# Patient Record
Sex: MALE | Race: ASIAN | NOT HISPANIC OR LATINO | URBAN - METROPOLITAN AREA
[De-identification: names, ages, dates, MRNs, and addresses within clinical notes are randomized per-mention and may not be internally consistent; named-entity substitution may affect disease eponyms.]

---

## 2020-06-09 ENCOUNTER — OFFICE VISIT (OUTPATIENT)
Dept: URBAN - METROPOLITAN AREA MEDICAL CENTER 10 | Facility: MEDICAL CENTER | Age: 85
End: 2020-06-09
Payer: MEDICARE

## 2020-06-09 DIAGNOSIS — K31.89 ACQUIRED DEFORMITY OF PYLORUS: ICD-10-CM

## 2020-06-09 DIAGNOSIS — K76.6 CLINICALLY SIGNIFICANT PORTAL HYPERTENSION: ICD-10-CM

## 2020-06-09 PROCEDURE — 43235 EGD DIAGNOSTIC BRUSH WASH: CPT | Performed by: INTERNAL MEDICINE

## 2020-06-16 ENCOUNTER — LAB OUTSIDE AN ENCOUNTER (OUTPATIENT)
Dept: URBAN - METROPOLITAN AREA CLINIC 78 | Facility: CLINIC | Age: 85
End: 2020-06-16

## 2020-06-16 ENCOUNTER — OFFICE VISIT (OUTPATIENT)
Dept: URBAN - METROPOLITAN AREA CLINIC 78 | Facility: CLINIC | Age: 85
End: 2020-06-16
Payer: MEDICARE

## 2020-06-16 DIAGNOSIS — D64.9 ANEMIA: ICD-10-CM

## 2020-06-16 DIAGNOSIS — Z85.79 HISTORY OF LYMPHOMA: ICD-10-CM

## 2020-06-16 DIAGNOSIS — I85.00 IDIOPATHIC ESOPHAGEAL VARICES WITHOUT BLEEDING: ICD-10-CM

## 2020-06-16 DIAGNOSIS — K76.6 PORTAL HYPERTENSION: ICD-10-CM

## 2020-06-16 DIAGNOSIS — Z86.010 PERSONAL HISTORY OF COLONIC POLYPS: ICD-10-CM

## 2020-06-16 DIAGNOSIS — K74.60 HEPATIC CIRRHOSIS, UNSPECIFIED HEPATIC CIRRHOSIS TYPE, UNSPECIFIED WHETHER ASCITES PRESENT: ICD-10-CM

## 2020-06-16 DIAGNOSIS — K31.89 OTHER DISEASES OF STOMACH AND DUODENUM: ICD-10-CM

## 2020-06-16 DIAGNOSIS — R16.1 SPLENOMEGALY: ICD-10-CM

## 2020-06-16 DIAGNOSIS — D64.89 ANEMIA DUE TO OTHER CAUSE: ICD-10-CM

## 2020-06-16 DIAGNOSIS — R60.0 PEDAL EDEMA: ICD-10-CM

## 2020-06-16 DIAGNOSIS — K57.90 DIVERTICULOSIS: ICD-10-CM

## 2020-06-16 DIAGNOSIS — R18.8 OTHER ASCITES: ICD-10-CM

## 2020-06-16 DIAGNOSIS — K74.69 CIRRHOSIS, CRYPTOGENIC: ICD-10-CM

## 2020-06-16 DIAGNOSIS — Z85.46 HISTORY OF PROSTATE CANCER: ICD-10-CM

## 2020-06-16 PROCEDURE — G8420 CALC BMI NORM PARAMETERS: HCPCS | Performed by: INTERNAL MEDICINE

## 2020-06-16 PROCEDURE — G9903 PT SCRN TBCO ID AS NON USER: HCPCS | Performed by: INTERNAL MEDICINE

## 2020-06-16 PROCEDURE — G8427 DOCREV CUR MEDS BY ELIG CLIN: HCPCS | Performed by: INTERNAL MEDICINE

## 2020-06-16 PROCEDURE — 99215 OFFICE O/P EST HI 40 MIN: CPT | Performed by: INTERNAL MEDICINE

## 2020-06-16 PROCEDURE — 1036F TOBACCO NON-USER: CPT | Performed by: INTERNAL MEDICINE

## 2020-06-16 RX ORDER — CANAGLIFLOZIN 100 MG/1
TAKE 1 TABLET (100 MG) BY ORAL ROUTE ONCE DAILY BEFORE THE FIRST MEAL OF THE DAY TABLET, FILM COATED ORAL 1
Qty: 0 | Refills: 0 | Status: ACTIVE | COMMUNITY
Start: 1900-01-01

## 2020-06-16 RX ORDER — SPIRONOLACTONE 25 MG/1
TAKE 1 TABLET (25 MG) BY ORAL ROUTE ONCE DAILY FOR 90 DAYS TABLET ORAL 1
OUTPATIENT
Start: 2020-01-06 | End: 2020-07-04

## 2020-06-16 RX ORDER — NADOLOL 20 MG/1
TAKE 1 TABLET (20 MG) BY ORAL ROUTE ONCE DAILY FOR 90 DAYS TABLET ORAL 1
Qty: 90 | Refills: 1 | Status: ACTIVE | COMMUNITY
Start: 2020-01-06 | End: 2020-07-04

## 2020-06-16 RX ORDER — FUROSEMIDE 20 MG/1
TAKE 1 TABLET (20 MG) BY ORAL ROUTE ONCE DAILY FOR 90 DAYS TABLET ORAL 1
Qty: 90 | Refills: 1 | Status: ACTIVE | COMMUNITY
Start: 2020-01-06 | End: 2020-07-04

## 2020-06-16 RX ORDER — VALSARTAN 160 MG/1
TABLET ORAL
Qty: 0 | Refills: 0 | Status: ACTIVE | COMMUNITY
Start: 1900-01-01

## 2020-06-16 RX ORDER — NADOLOL 20 MG/1
TAKE 1 TABLET (20 MG) BY ORAL ROUTE ONCE DAILY FOR 90 DAYS TABLET ORAL 1
OUTPATIENT
Start: 2020-01-06 | End: 2020-07-04

## 2020-06-16 RX ORDER — SITAGLIPTIN AND METFORMIN HYDROCHLORIDE 50; 1000 MG/1; MG/1
TAKE 1 TABLET BY ORAL ROUTE 2 TIMES PER DAY WITH MEALS TABLET, FILM COATED ORAL 2
Qty: 0 | Refills: 0 | Status: ACTIVE | COMMUNITY
Start: 1900-01-01

## 2020-06-16 RX ORDER — FUROSEMIDE 20 MG/1
TAKE 1 TABLET (20 MG) BY ORAL ROUTE ONCE DAILY FOR 90 DAYS TABLET ORAL 1
OUTPATIENT
Start: 2020-01-06 | End: 2020-07-04

## 2020-06-16 RX ORDER — SPIRONOLACTONE 25 MG/1
TAKE 1 TABLET (25 MG) BY ORAL ROUTE ONCE DAILY FOR 90 DAYS TABLET ORAL 1
Qty: 90 | Refills: 1 | Status: ACTIVE | COMMUNITY
Start: 2020-01-06 | End: 2020-07-04

## 2020-06-16 NOTE — HPI-TODAY'S VISIT:
Patient is here in a follow up after the recent upper endoscopy.  The findings of the procedure and the pathology were discussed with the patient.  All questions were answered to the patient's satisfaction.  His last colonoscopy was in 8/2018  Patient denies any new complaints - valentina nausea. emesis, change in bowel habits, GI bleeding etc.  Pt denies change in abdominal girth or edema feet

## 2020-07-14 ENCOUNTER — ERX REFILL RESPONSE (OUTPATIENT)
Age: 85
End: 2020-07-14

## 2020-07-14 RX ORDER — SPIRONOLACTONE 25 MG/1
TAKE ONE TABLET BY MOUTH DAILY TABLET, FILM COATED ORAL
Qty: 90 | Refills: 0

## 2020-07-14 RX ORDER — NADOLOL 20 MG/1
TAKE ONE TABLET BY MOUTH DAILY TABLET ORAL
Qty: 75 | Refills: 0

## 2020-07-14 RX ORDER — FUROSEMIDE 20 MG/1
TAKE ONE TABLET BY MOUTH DAILY TABLET ORAL
Qty: 90 | Refills: 0

## 2020-07-18 LAB
DEAMIDATED GLIADIN ABS, IGA: 3
DEAMIDATED GLIADIN ABS, IGG: 5
ENDOMYSIAL ANTIBODY IGA: NEGATIVE
FERRITIN, SERUM: 82
FOLATE (FOLIC ACID), SERUM: 9
IMMUNOGLOBULIN A, QN, SERUM: 208
IRON BIND.CAP.(TIBC): 363
IRON SATURATION: 14
IRON: 52
T-TRANSGLUTAMINASE (TTG) IGA: <2
T-TRANSGLUTAMINASE (TTG) IGG: <2
UIBC: 311
VITAMIN B12: 1648

## 2020-07-22 ENCOUNTER — LAB OUTSIDE AN ENCOUNTER (OUTPATIENT)
Dept: URBAN - METROPOLITAN AREA CLINIC 78 | Facility: CLINIC | Age: 85
End: 2020-07-22

## 2020-07-22 ENCOUNTER — OFFICE VISIT (OUTPATIENT)
Dept: URBAN - METROPOLITAN AREA CLINIC 78 | Facility: CLINIC | Age: 85
End: 2020-07-22
Payer: MEDICARE

## 2020-07-22 ENCOUNTER — OFFICE VISIT (OUTPATIENT)
Dept: URBAN - METROPOLITAN AREA CLINIC 78 | Facility: CLINIC | Age: 85
End: 2020-07-22

## 2020-07-22 DIAGNOSIS — R18.8 OTHER ASCITES: ICD-10-CM

## 2020-07-22 DIAGNOSIS — I85.10 SECONDARY ESOPHAGEAL VARICES WITHOUT BLEEDING: ICD-10-CM

## 2020-07-22 DIAGNOSIS — K76.6 PORTAL HYPERTENSION: ICD-10-CM

## 2020-07-22 DIAGNOSIS — K74.69 OTHER CIRRHOSIS OF LIVER: ICD-10-CM

## 2020-07-22 PROCEDURE — 99214 OFFICE O/P EST MOD 30 MIN: CPT | Performed by: INTERNAL MEDICINE

## 2020-07-22 RX ORDER — SPIRONOLACTONE 25 MG/1
TAKE ONE TABLET BY MOUTH DAILY TABLET, FILM COATED ORAL
Qty: 90

## 2020-07-22 RX ORDER — NADOLOL 20 MG/1
TAKE ONE TABLET BY MOUTH DAILY TABLET ORAL
Qty: 75 | Refills: 0 | Status: ACTIVE | COMMUNITY

## 2020-07-22 RX ORDER — SPIRONOLACTONE 25 MG/1
TAKE ONE TABLET BY MOUTH DAILY TABLET, FILM COATED ORAL
Qty: 90 | Refills: 0 | Status: ON HOLD | COMMUNITY

## 2020-07-22 RX ORDER — CANAGLIFLOZIN 100 MG/1
TAKE 1 TABLET (100 MG) BY ORAL ROUTE ONCE DAILY BEFORE THE FIRST MEAL OF THE DAY TABLET, FILM COATED ORAL 1
Qty: 0 | Refills: 0 | Status: ON HOLD | COMMUNITY
Start: 1900-01-01

## 2020-07-22 RX ORDER — VALSARTAN 160 MG/1
TABLET ORAL
Qty: 0 | Refills: 0 | Status: ON HOLD | COMMUNITY
Start: 1900-01-01

## 2020-07-22 RX ORDER — SITAGLIPTIN AND METFORMIN HYDROCHLORIDE 50; 1000 MG/1; MG/1
TAKE 1 TABLET BY ORAL ROUTE 2 TIMES PER DAY WITH MEALS TABLET, FILM COATED ORAL 2
Qty: 0 | Refills: 0 | Status: ACTIVE | COMMUNITY
Start: 1900-01-01

## 2020-07-22 RX ORDER — FUROSEMIDE 20 MG/1
TAKE ONE TABLET BY MOUTH DAILY TABLET ORAL
Qty: 90 | Refills: 0 | Status: ON HOLD | COMMUNITY

## 2020-07-22 RX ORDER — NADOLOL 20 MG/1
TAKE ONE TABLET BY MOUTH DAILY TABLET ORAL
Qty: 75

## 2020-07-22 RX ORDER — FUROSEMIDE 20 MG/1
TAKE ONE TABLET BY MOUTH DAILY TABLET ORAL
Qty: 90

## 2020-09-15 ENCOUNTER — OFFICE VISIT (OUTPATIENT)
Dept: URBAN - METROPOLITAN AREA MEDICAL CENTER 10 | Facility: MEDICAL CENTER | Age: 85
End: 2020-09-15

## 2020-09-16 ENCOUNTER — OFFICE VISIT (OUTPATIENT)
Dept: URBAN - METROPOLITAN AREA CLINIC 77 | Facility: CLINIC | Age: 85
End: 2020-09-16
Payer: MEDICARE

## 2020-09-16 DIAGNOSIS — B18.1 CARRIER OF HEPATITIS B: ICD-10-CM

## 2020-09-16 DIAGNOSIS — K74.69 CIRRHOSIS, CRYPTOGENIC: ICD-10-CM

## 2020-09-16 PROCEDURE — 76700 US EXAM ABDOM COMPLETE: CPT | Performed by: INTERNAL MEDICINE

## 2020-09-16 RX ORDER — CANAGLIFLOZIN 100 MG/1
TAKE 1 TABLET (100 MG) BY ORAL ROUTE ONCE DAILY BEFORE THE FIRST MEAL OF THE DAY TABLET, FILM COATED ORAL 1
Qty: 0 | Refills: 0 | Status: ON HOLD | COMMUNITY
Start: 1900-01-01

## 2020-09-16 RX ORDER — FUROSEMIDE 20 MG/1
TAKE ONE TABLET BY MOUTH DAILY TABLET ORAL
Qty: 90 | Status: ACTIVE | COMMUNITY

## 2020-09-16 RX ORDER — NADOLOL 20 MG/1
TAKE ONE TABLET BY MOUTH DAILY TABLET ORAL
Qty: 75 | Status: ACTIVE | COMMUNITY

## 2020-09-16 RX ORDER — SITAGLIPTIN AND METFORMIN HYDROCHLORIDE 50; 1000 MG/1; MG/1
TAKE 1 TABLET BY ORAL ROUTE 2 TIMES PER DAY WITH MEALS TABLET, FILM COATED ORAL 2
Qty: 0 | Refills: 0 | Status: ACTIVE | COMMUNITY
Start: 1900-01-01

## 2020-09-16 RX ORDER — VALSARTAN 160 MG/1
TABLET ORAL
Qty: 0 | Refills: 0 | Status: ON HOLD | COMMUNITY
Start: 1900-01-01

## 2020-09-16 RX ORDER — SPIRONOLACTONE 25 MG/1
TAKE ONE TABLET BY MOUTH DAILY TABLET, FILM COATED ORAL
Qty: 90 | Status: ACTIVE | COMMUNITY

## 2020-10-27 ENCOUNTER — OUT OF OFFICE VISIT (OUTPATIENT)
Dept: URBAN - METROPOLITAN AREA MEDICAL CENTER 10 | Facility: MEDICAL CENTER | Age: 85
End: 2020-10-27
Payer: MEDICARE

## 2020-10-27 DIAGNOSIS — D64.89 ANEMIA DUE TO OTHER CAUSE: ICD-10-CM

## 2020-10-27 DIAGNOSIS — K74.69 CIRRHOSIS, CRYPTOGENIC: ICD-10-CM

## 2020-10-27 PROCEDURE — 99222 1ST HOSP IP/OBS MODERATE 55: CPT | Performed by: INTERNAL MEDICINE

## 2020-10-27 PROCEDURE — G8427 DOCREV CUR MEDS BY ELIG CLIN: HCPCS | Performed by: INTERNAL MEDICINE

## 2020-10-27 PROCEDURE — 99233 SBSQ HOSP IP/OBS HIGH 50: CPT | Performed by: INTERNAL MEDICINE

## 2020-11-15 ENCOUNTER — ERX REFILL RESPONSE (OUTPATIENT)
Age: 85
End: 2020-11-15

## 2020-11-15 RX ORDER — NADOLOL 20 MG/1
TAKE ONE TABLET BY MOUTH DAILY TABLET ORAL
Qty: 75 | Refills: 0

## 2021-01-19 ENCOUNTER — OFFICE VISIT (OUTPATIENT)
Dept: URBAN - METROPOLITAN AREA MEDICAL CENTER 10 | Facility: MEDICAL CENTER | Age: 86
End: 2021-01-19

## 2021-01-26 ENCOUNTER — ERX REFILL RESPONSE (OUTPATIENT)
Age: 86
End: 2021-01-26

## 2021-01-26 RX ORDER — FUROSEMIDE 20 MG/1
TAKE ONE TABLET BY MOUTH DAILY TABLET ORAL
Qty: 90 | Refills: 0

## 2021-01-26 RX ORDER — SPIRONOLACTONE 25 MG/1
TAKE ONE TABLET BY MOUTH DAILY TABLET, FILM COATED ORAL
Qty: 90 | Refills: 0

## 2021-04-16 ENCOUNTER — TELEPHONE ENCOUNTER (OUTPATIENT)
Dept: URBAN - METROPOLITAN AREA CLINIC 78 | Facility: CLINIC | Age: 86
End: 2021-04-16

## 2021-04-20 PROBLEM — 14223005 ESOPHAGEAL VARICES WITHOUT BLEEDING: Status: ACTIVE | Noted: 2021-04-20

## 2021-05-11 ENCOUNTER — ERX REFILL RESPONSE (OUTPATIENT)
Dept: URBAN - METROPOLITAN AREA CLINIC 78 | Facility: CLINIC | Age: 86
End: 2021-05-11

## 2021-05-11 RX ORDER — NADOLOL 20 MG/1
TAKE ONE TABLET BY MOUTH DAILY TABLET ORAL
Qty: 90 | Refills: 0

## 2021-05-11 RX ORDER — SPIRONOLACTONE 25 MG/1
TAKE ONE TABLET BY MOUTH DAILY TABLET, FILM COATED ORAL
Qty: 90 | Refills: 0

## 2021-05-11 RX ORDER — FUROSEMIDE 20 MG/1
TAKE ONE TABLET BY MOUTH DAILY TABLET ORAL
Qty: 90 | Refills: 0

## 2021-05-18 ENCOUNTER — OFFICE VISIT (OUTPATIENT)
Dept: URBAN - METROPOLITAN AREA MEDICAL CENTER 10 | Facility: MEDICAL CENTER | Age: 86
End: 2021-05-18
Payer: MEDICARE

## 2021-05-18 DIAGNOSIS — K29.60 ADENOPAPILLOMATOSIS GASTRICA: ICD-10-CM

## 2021-05-18 DIAGNOSIS — K76.6 CLINICALLY SIGNIFICANT PORTAL HYPERTENSION: ICD-10-CM

## 2021-05-18 PROCEDURE — 43239 EGD BIOPSY SINGLE/MULTIPLE: CPT | Performed by: INTERNAL MEDICINE

## 2021-05-18 RX ORDER — NADOLOL 20 MG/1
TAKE ONE TABLET BY MOUTH DAILY TABLET ORAL
Qty: 90 | Refills: 0 | Status: ACTIVE | COMMUNITY

## 2021-05-18 RX ORDER — SPIRONOLACTONE 25 MG/1
TAKE ONE TABLET BY MOUTH DAILY TABLET, FILM COATED ORAL
Qty: 90 | Refills: 0 | Status: ACTIVE | COMMUNITY

## 2021-05-18 RX ORDER — VALSARTAN 160 MG/1
TABLET ORAL
Qty: 0 | Refills: 0 | Status: ON HOLD | COMMUNITY
Start: 1900-01-01

## 2021-05-18 RX ORDER — SITAGLIPTIN AND METFORMIN HYDROCHLORIDE 50; 1000 MG/1; MG/1
TAKE 1 TABLET BY ORAL ROUTE 2 TIMES PER DAY WITH MEALS TABLET, FILM COATED ORAL 2
Qty: 0 | Refills: 0 | Status: ACTIVE | COMMUNITY
Start: 1900-01-01

## 2021-05-18 RX ORDER — CANAGLIFLOZIN 100 MG/1
TAKE 1 TABLET (100 MG) BY ORAL ROUTE ONCE DAILY BEFORE THE FIRST MEAL OF THE DAY TABLET, FILM COATED ORAL 1
Qty: 0 | Refills: 0 | Status: ON HOLD | COMMUNITY
Start: 1900-01-01

## 2021-05-18 RX ORDER — FUROSEMIDE 20 MG/1
TAKE ONE TABLET BY MOUTH DAILY TABLET ORAL
Qty: 90 | Refills: 0 | Status: ACTIVE | COMMUNITY

## 2021-06-04 ENCOUNTER — LAB OUTSIDE AN ENCOUNTER (OUTPATIENT)
Dept: URBAN - METROPOLITAN AREA CLINIC 78 | Facility: CLINIC | Age: 86
End: 2021-06-04

## 2021-06-04 ENCOUNTER — OFFICE VISIT (OUTPATIENT)
Dept: URBAN - METROPOLITAN AREA CLINIC 78 | Facility: CLINIC | Age: 86
End: 2021-06-04
Payer: MEDICARE

## 2021-06-04 DIAGNOSIS — Z85.79 HISTORY OF LYMPHOMA: ICD-10-CM

## 2021-06-04 DIAGNOSIS — R16.1 SPLENOMEGALY: ICD-10-CM

## 2021-06-04 DIAGNOSIS — K57.90 DIVERTICULOSIS: ICD-10-CM

## 2021-06-04 DIAGNOSIS — R18.8 OTHER ASCITES: ICD-10-CM

## 2021-06-04 DIAGNOSIS — Z86.010 PERSONAL HISTORY OF COLONIC POLYPS: ICD-10-CM

## 2021-06-04 DIAGNOSIS — K74.60 HEPATIC CIRRHOSIS, UNSPECIFIED HEPATIC CIRRHOSIS TYPE, UNSPECIFIED WHETHER ASCITES PRESENT: ICD-10-CM

## 2021-06-04 DIAGNOSIS — K76.6 PORTAL HYPERTENSION: ICD-10-CM

## 2021-06-04 DIAGNOSIS — K31.89 OTHER DISEASES OF STOMACH AND DUODENUM: ICD-10-CM

## 2021-06-04 DIAGNOSIS — R60.0 PEDAL EDEMA: ICD-10-CM

## 2021-06-04 DIAGNOSIS — I85.00 IDIOPATHIC ESOPHAGEAL VARICES WITHOUT BLEEDING: ICD-10-CM

## 2021-06-04 DIAGNOSIS — Z85.46 HISTORY OF PROSTATE CANCER: ICD-10-CM

## 2021-06-04 DIAGNOSIS — D64.9 ANEMIA: ICD-10-CM

## 2021-06-04 PROCEDURE — 99214 OFFICE O/P EST MOD 30 MIN: CPT | Performed by: INTERNAL MEDICINE

## 2021-06-04 RX ORDER — SPIRONOLACTONE 25 MG/1
TAKE ONE TABLET BY MOUTH DAILY TABLET, FILM COATED ORAL
Qty: 90

## 2021-06-04 RX ORDER — FUROSEMIDE 20 MG/1
TAKE ONE TABLET BY MOUTH DAILY TABLET ORAL
Qty: 90 | Refills: 0 | Status: ACTIVE | COMMUNITY

## 2021-06-04 RX ORDER — VALSARTAN 160 MG/1
TABLET ORAL
Qty: 0 | Refills: 0 | Status: ON HOLD | COMMUNITY
Start: 1900-01-01

## 2021-06-04 RX ORDER — SPIRONOLACTONE 25 MG/1
TAKE ONE TABLET BY MOUTH DAILY TABLET, FILM COATED ORAL
Qty: 90 | Refills: 0 | Status: ACTIVE | COMMUNITY

## 2021-06-04 RX ORDER — NADOLOL 20 MG/1
TAKE ONE TABLET BY MOUTH DAILY TABLET ORAL
Qty: 90 | Refills: 0 | Status: ACTIVE | COMMUNITY

## 2021-06-04 RX ORDER — NADOLOL 20 MG/1
TAKE ONE TABLET BY MOUTH DAILY TABLET ORAL
Qty: 75

## 2021-06-04 RX ORDER — CANAGLIFLOZIN 100 MG/1
TAKE 1 TABLET (100 MG) BY ORAL ROUTE ONCE DAILY BEFORE THE FIRST MEAL OF THE DAY TABLET, FILM COATED ORAL 1
Qty: 0 | Refills: 0 | Status: ON HOLD | COMMUNITY
Start: 1900-01-01

## 2021-06-04 RX ORDER — FUROSEMIDE 20 MG/1
TAKE ONE TABLET BY MOUTH DAILY TABLET ORAL
Qty: 90

## 2021-06-04 RX ORDER — SITAGLIPTIN AND METFORMIN HYDROCHLORIDE 50; 1000 MG/1; MG/1
TAKE 1 TABLET BY ORAL ROUTE 2 TIMES PER DAY WITH MEALS TABLET, FILM COATED ORAL 2
Qty: 0 | Refills: 0 | Status: ACTIVE | COMMUNITY
Start: 1900-01-01

## 2021-06-04 NOTE — HPI-TODAY'S VISIT:
Patient is here in a follow up after the recent upper endoscopy.  Patient has no new complaints Patient is taking the meds as prescribed Patient is following the lifestyle and dietary modifications as previously discussed The findings of the procedure and the pathology were discussed with the patient.  All questions were answered to the patient's satisfaction.

## 2021-07-30 ENCOUNTER — ERX REFILL RESPONSE (OUTPATIENT)
Dept: URBAN - METROPOLITAN AREA CLINIC 78 | Facility: CLINIC | Age: 86
End: 2021-07-30

## 2021-07-30 RX ORDER — SPIRONOLACTONE 25 MG/1
TAKE ONE TABLET BY MOUTH DAILY TABLET, FILM COATED ORAL
Qty: 90 TABLET | Refills: 1 | OUTPATIENT

## 2021-07-30 RX ORDER — NADOLOL 20 MG/1
TAKE ONE TABLET BY MOUTH DAILY TABLET ORAL
Qty: 90 TABLET | Refills: 1 | OUTPATIENT

## 2021-07-30 RX ORDER — FUROSEMIDE 20 MG/1
TAKE ONE TABLET BY MOUTH DAILY TABLET ORAL
Qty: 90 TABLET | Refills: 1 | OUTPATIENT

## 2021-11-08 ENCOUNTER — ERX REFILL RESPONSE (OUTPATIENT)
Dept: URBAN - METROPOLITAN AREA CLINIC 78 | Facility: CLINIC | Age: 86
End: 2021-11-08

## 2021-11-08 RX ORDER — NADOLOL 20 MG/1
TAKE ONE TABLET BY MOUTH DAILY TABLET ORAL
Qty: 90 TABLET | Refills: 1 | OUTPATIENT

## 2021-11-08 RX ORDER — SPIRONOLACTONE 25 MG/1
TAKE ONE TABLET BY MOUTH DAILY TABLET, FILM COATED ORAL
Qty: 90 TABLET | Refills: 1 | OUTPATIENT

## 2021-11-08 RX ORDER — FUROSEMIDE 20 MG/1
TAKE ONE TABLET BY MOUTH DAILY TABLET ORAL
Qty: 90 TABLET | Refills: 1 | OUTPATIENT

## 2021-11-08 RX ORDER — FUROSEMIDE 20 MG/1
TAKE ONE TABLET BY MOUTH DAILY TABLET ORAL
Qty: 90 TABLET | Refills: 0 | OUTPATIENT

## 2021-11-24 ENCOUNTER — OFFICE VISIT (OUTPATIENT)
Dept: URBAN - METROPOLITAN AREA CLINIC 77 | Facility: CLINIC | Age: 86
End: 2021-11-24
Payer: MEDICARE

## 2021-11-24 DIAGNOSIS — K80.20 CHOLELITHIASES: ICD-10-CM

## 2021-11-24 DIAGNOSIS — K74.60 CIRRHOSIS: ICD-10-CM

## 2021-11-24 PROCEDURE — 76700 US EXAM ABDOM COMPLETE: CPT | Performed by: INTERNAL MEDICINE

## 2021-11-24 RX ORDER — SITAGLIPTIN AND METFORMIN HYDROCHLORIDE 50; 1000 MG/1; MG/1
TAKE 1 TABLET BY ORAL ROUTE 2 TIMES PER DAY WITH MEALS TABLET, FILM COATED ORAL 2
Qty: 0 | Refills: 0 | Status: ACTIVE | COMMUNITY
Start: 1900-01-01

## 2021-11-24 RX ORDER — FUROSEMIDE 20 MG/1
TAKE ONE TABLET BY MOUTH DAILY TABLET ORAL
Qty: 90 TABLET | Refills: 1 | Status: ACTIVE | COMMUNITY

## 2021-11-24 RX ORDER — NADOLOL 20 MG/1
TAKE ONE TABLET BY MOUTH DAILY TABLET ORAL
Qty: 90 TABLET | Refills: 1 | Status: ACTIVE | COMMUNITY

## 2021-11-24 RX ORDER — SPIRONOLACTONE 25 MG/1
TAKE ONE TABLET BY MOUTH DAILY TABLET, FILM COATED ORAL
Qty: 90 TABLET | Refills: 1 | Status: ACTIVE | COMMUNITY

## 2021-11-24 RX ORDER — FUROSEMIDE 20 MG/1
TAKE ONE TABLET BY MOUTH DAILY TABLET ORAL
Qty: 90 | Status: ACTIVE | COMMUNITY

## 2021-11-24 RX ORDER — VALSARTAN 160 MG/1
TABLET ORAL
Qty: 0 | Refills: 0 | Status: ON HOLD | COMMUNITY
Start: 1900-01-01

## 2021-11-24 RX ORDER — CANAGLIFLOZIN 100 MG/1
TAKE 1 TABLET (100 MG) BY ORAL ROUTE ONCE DAILY BEFORE THE FIRST MEAL OF THE DAY TABLET, FILM COATED ORAL 1
Qty: 0 | Refills: 0 | Status: ON HOLD | COMMUNITY
Start: 1900-01-01

## 2021-11-24 RX ORDER — FUROSEMIDE 20 MG/1
TAKE ONE TABLET BY MOUTH DAILY TABLET ORAL
Qty: 90 | Refills: 0 | Status: ACTIVE | COMMUNITY

## 2021-11-29 ENCOUNTER — TELEPHONE ENCOUNTER (OUTPATIENT)
Dept: URBAN - METROPOLITAN AREA CLINIC 78 | Facility: CLINIC | Age: 86
End: 2021-11-29

## 2021-12-01 ENCOUNTER — OFFICE VISIT (OUTPATIENT)
Dept: URBAN - METROPOLITAN AREA CLINIC 78 | Facility: CLINIC | Age: 86
End: 2021-12-01
Payer: MEDICARE

## 2021-12-01 ENCOUNTER — LAB OUTSIDE AN ENCOUNTER (OUTPATIENT)
Dept: URBAN - METROPOLITAN AREA CLINIC 78 | Facility: CLINIC | Age: 86
End: 2021-12-01

## 2021-12-01 DIAGNOSIS — Z85.46 HISTORY OF PROSTATE CANCER: ICD-10-CM

## 2021-12-01 DIAGNOSIS — R60.0 PEDAL EDEMA: ICD-10-CM

## 2021-12-01 DIAGNOSIS — K57.90 DIVERTICULOSIS: ICD-10-CM

## 2021-12-01 DIAGNOSIS — Z85.79 HISTORY OF LYMPHOMA: ICD-10-CM

## 2021-12-01 DIAGNOSIS — K74.60 HEPATIC CIRRHOSIS, UNSPECIFIED HEPATIC CIRRHOSIS TYPE, UNSPECIFIED WHETHER ASCITES PRESENT: ICD-10-CM

## 2021-12-01 DIAGNOSIS — D64.9 ANEMIA: ICD-10-CM

## 2021-12-01 DIAGNOSIS — K31.89 OTHER DISEASES OF STOMACH AND DUODENUM: ICD-10-CM

## 2021-12-01 DIAGNOSIS — R16.1 SPLENOMEGALY: ICD-10-CM

## 2021-12-01 DIAGNOSIS — I85.00 IDIOPATHIC ESOPHAGEAL VARICES WITHOUT BLEEDING: ICD-10-CM

## 2021-12-01 DIAGNOSIS — Z86.010 PERSONAL HISTORY OF COLONIC POLYPS: ICD-10-CM

## 2021-12-01 DIAGNOSIS — K76.6 PORTAL HYPERTENSION: ICD-10-CM

## 2021-12-01 DIAGNOSIS — R18.8 OTHER ASCITES: ICD-10-CM

## 2021-12-01 PROCEDURE — 99214 OFFICE O/P EST MOD 30 MIN: CPT | Performed by: INTERNAL MEDICINE

## 2021-12-01 RX ORDER — FUROSEMIDE 20 MG/1
TAKE ONE TABLET BY MOUTH DAILY TABLET ORAL
Qty: 90 | Refills: 0 | Status: ACTIVE | COMMUNITY

## 2021-12-01 RX ORDER — NADOLOL 20 MG/1
TAKE ONE TABLET BY MOUTH DAILY TABLET ORAL
Qty: 90 TABLET | Refills: 1 | Status: ACTIVE | COMMUNITY

## 2021-12-01 RX ORDER — VALSARTAN 160 MG/1
TABLET ORAL
Qty: 0 | Refills: 0 | Status: ON HOLD | COMMUNITY
Start: 1900-01-01

## 2021-12-01 RX ORDER — SPIRONOLACTONE 25 MG/1
TAKE ONE TABLET BY MOUTH DAILY TABLET, FILM COATED ORAL
Qty: 90 TABLET | Refills: 1 | Status: ACTIVE | COMMUNITY

## 2021-12-01 RX ORDER — SITAGLIPTIN AND METFORMIN HYDROCHLORIDE 50; 1000 MG/1; MG/1
TAKE 1 TABLET BY ORAL ROUTE 2 TIMES PER DAY WITH MEALS TABLET, FILM COATED ORAL 2
Qty: 0 | Refills: 0 | Status: ACTIVE | COMMUNITY
Start: 1900-01-01

## 2021-12-01 RX ORDER — FUROSEMIDE 20 MG/1
TAKE ONE TABLET BY MOUTH DAILY TABLET ORAL
Qty: 90 | Status: ACTIVE | COMMUNITY

## 2021-12-01 RX ORDER — FUROSEMIDE 20 MG/1
TAKE ONE TABLET BY MOUTH DAILY TABLET ORAL
Qty: 90 TABLET | Refills: 1 | Status: ACTIVE | COMMUNITY

## 2021-12-01 RX ORDER — CANAGLIFLOZIN 100 MG/1
TAKE 1 TABLET (100 MG) BY ORAL ROUTE ONCE DAILY BEFORE THE FIRST MEAL OF THE DAY TABLET, FILM COATED ORAL 1
Qty: 0 | Refills: 0 | Status: ON HOLD | COMMUNITY
Start: 1900-01-01

## 2021-12-01 RX ORDER — NADOLOL 20 MG/1
TAKE ONE TABLET BY MOUTH DAILY TABLET ORAL
Qty: 75

## 2021-12-01 RX ORDER — SPIRONOLACTONE 25 MG/1
TAKE ONE TABLET BY MOUTH DAILY TABLET, FILM COATED ORAL
Qty: 90

## 2021-12-01 RX ORDER — FUROSEMIDE 20 MG/1
TAKE ONE TABLET BY MOUTH DAILY TABLET ORAL
Qty: 90

## 2021-12-01 NOTE — HPI-TODAY'S VISIT:
Pt is here in f/u He had shingles on his rt side a few weeks ago HE is recovering Denies abdominal distension / pedal edema / gi bleed / n / v / fever Recent US shows:  1. Cirrhosis 2. POrtal HTN - Splenomegaly / Ascites 3. Cholelithiasis 4. Liver cyst

## 2021-12-14 ENCOUNTER — OFFICE VISIT (OUTPATIENT)
Dept: URBAN - METROPOLITAN AREA MEDICAL CENTER 10 | Facility: MEDICAL CENTER | Age: 86
End: 2021-12-14
Payer: MEDICARE

## 2021-12-14 DIAGNOSIS — K31.89 ACQUIRED DEFORMITY OF DUODENUM: ICD-10-CM

## 2021-12-14 PROCEDURE — 43235 EGD DIAGNOSTIC BRUSH WASH: CPT | Performed by: INTERNAL MEDICINE

## 2022-02-19 ENCOUNTER — OUT OF OFFICE VISIT (OUTPATIENT)
Dept: URBAN - METROPOLITAN AREA MEDICAL CENTER 10 | Facility: MEDICAL CENTER | Age: 87
End: 2022-02-19
Payer: MEDICARE

## 2022-02-19 DIAGNOSIS — R19.7 ACUTE DIARRHEA: ICD-10-CM

## 2022-02-19 DIAGNOSIS — K92.1 ACUTE MELENA: ICD-10-CM

## 2022-02-19 DIAGNOSIS — R93.3 ABN FINDINGS-GI TRACT: ICD-10-CM

## 2022-02-19 DIAGNOSIS — I85.10 ESOPH VARICE OTHER DIS: ICD-10-CM

## 2022-02-19 DIAGNOSIS — K31.89 ACQUIRED DEFORMITY OF DUODENUM: ICD-10-CM

## 2022-02-19 DIAGNOSIS — K62.5 ANAL BLEEDING: ICD-10-CM

## 2022-02-19 DIAGNOSIS — K74.69 CIRRHOSIS, CRYPTOGENIC: ICD-10-CM

## 2022-02-19 DIAGNOSIS — I86.8 ABDOMINAL VARICOSITIES: ICD-10-CM

## 2022-02-19 DIAGNOSIS — D50.0 ANEMIA: ICD-10-CM

## 2022-02-19 DIAGNOSIS — K76.6 CLINICALLY SIGNIFICANT PORTAL HYPERTENSION: ICD-10-CM

## 2022-02-19 PROCEDURE — 99223 1ST HOSP IP/OBS HIGH 75: CPT | Performed by: INTERNAL MEDICINE

## 2022-02-19 PROCEDURE — 43235 EGD DIAGNOSTIC BRUSH WASH: CPT | Performed by: INTERNAL MEDICINE

## 2022-02-19 PROCEDURE — G8427 DOCREV CUR MEDS BY ELIG CLIN: HCPCS | Performed by: INTERNAL MEDICINE

## 2022-02-19 PROCEDURE — 45330 DIAGNOSTIC SIGMOIDOSCOPY: CPT | Performed by: INTERNAL MEDICINE

## 2022-02-19 PROCEDURE — 99232 SBSQ HOSP IP/OBS MODERATE 35: CPT | Performed by: INTERNAL MEDICINE

## 2022-02-22 ENCOUNTER — OUT OF OFFICE VISIT (OUTPATIENT)
Dept: URBAN - METROPOLITAN AREA MEDICAL CENTER 10 | Facility: MEDICAL CENTER | Age: 87
End: 2022-02-22
Payer: MEDICARE

## 2022-02-22 DIAGNOSIS — R93.3 ABN FINDINGS-GI TRACT: ICD-10-CM

## 2022-02-22 DIAGNOSIS — K74.69 CIRRHOSIS, CRYPTOGENIC: ICD-10-CM

## 2022-02-22 DIAGNOSIS — R19.7 ACUTE DIARRHEA: ICD-10-CM

## 2022-02-22 DIAGNOSIS — R74.8 ABNORMAL ALKALINE PHOSPHATASE TEST: ICD-10-CM

## 2022-02-22 PROCEDURE — 99232 SBSQ HOSP IP/OBS MODERATE 35: CPT | Performed by: INTERNAL MEDICINE

## 2022-03-28 ENCOUNTER — ERX REFILL RESPONSE (OUTPATIENT)
Dept: URBAN - METROPOLITAN AREA CLINIC 78 | Facility: CLINIC | Age: 87
End: 2022-03-28

## 2022-03-28 RX ORDER — SPIRONOLACTONE 25 MG/1
TAKE ONE TABLET BY MOUTH DAILY TABLET, FILM COATED ORAL
Qty: 90 | OUTPATIENT

## 2022-03-28 RX ORDER — FUROSEMIDE 20 MG/1
TAKE ONE TABLET BY MOUTH DAILY TABLET ORAL
Qty: 90 TABLET | Refills: 1 | OUTPATIENT

## 2022-03-28 RX ORDER — SPIRONOLACTONE 25 MG/1
TAKE ONE TABLET BY MOUTH DAILY TABLET, FILM COATED ORAL
Qty: 90 TABLET | Refills: 1 | OUTPATIENT

## 2022-03-28 RX ORDER — FUROSEMIDE 20 MG/1
TAKE ONE TABLET BY MOUTH DAILY TABLET ORAL
Qty: 90 | OUTPATIENT

## 2022-04-12 ENCOUNTER — TELEPHONE ENCOUNTER (OUTPATIENT)
Dept: URBAN - METROPOLITAN AREA CLINIC 78 | Facility: CLINIC | Age: 87
End: 2022-04-12

## 2022-04-18 ENCOUNTER — CLAIMS CREATED FROM THE CLAIM WINDOW (OUTPATIENT)
Dept: URBAN - METROPOLITAN AREA MEDICAL CENTER 10 | Facility: MEDICAL CENTER | Age: 87
End: 2022-04-18
Payer: MEDICARE

## 2022-04-18 DIAGNOSIS — R18.8 ABDOMINAL ASCITES: ICD-10-CM

## 2022-04-18 DIAGNOSIS — R19.5 ABNORMAL CONSISTENCY OF STOOL: ICD-10-CM

## 2022-04-18 DIAGNOSIS — D64.89 ANEMIA DUE TO OTHER CAUSE: ICD-10-CM

## 2022-04-18 DIAGNOSIS — K74.60 ADVANCED CIRRHOSIS: ICD-10-CM

## 2022-04-18 PROCEDURE — 99221 1ST HOSP IP/OBS SF/LOW 40: CPT | Performed by: PHYSICIAN ASSISTANT

## 2022-04-18 PROCEDURE — 99231 SBSQ HOSP IP/OBS SF/LOW 25: CPT | Performed by: PHYSICIAN ASSISTANT

## 2022-04-18 PROCEDURE — G8427 DOCREV CUR MEDS BY ELIG CLIN: HCPCS | Performed by: PHYSICIAN ASSISTANT

## 2022-04-27 ENCOUNTER — OFFICE VISIT (OUTPATIENT)
Dept: URBAN - METROPOLITAN AREA CLINIC 78 | Facility: CLINIC | Age: 87
End: 2022-04-27
Payer: MEDICARE

## 2022-04-27 DIAGNOSIS — D64.9 ANEMIA: ICD-10-CM

## 2022-04-27 DIAGNOSIS — R60.0 PEDAL EDEMA: ICD-10-CM

## 2022-04-27 DIAGNOSIS — Z86.010 PERSONAL HISTORY OF COLONIC POLYPS: ICD-10-CM

## 2022-04-27 DIAGNOSIS — K76.6 PORTAL HYPERTENSION: ICD-10-CM

## 2022-04-27 DIAGNOSIS — Z85.46 HISTORY OF PROSTATE CANCER: ICD-10-CM

## 2022-04-27 DIAGNOSIS — Z85.79 HISTORY OF LYMPHOMA: ICD-10-CM

## 2022-04-27 DIAGNOSIS — K31.89 OTHER DISEASES OF STOMACH AND DUODENUM: ICD-10-CM

## 2022-04-27 DIAGNOSIS — K57.90 DIVERTICULOSIS: ICD-10-CM

## 2022-04-27 DIAGNOSIS — D69.6 THROMBOCYTOPENIA: ICD-10-CM

## 2022-04-27 DIAGNOSIS — K74.60 HEPATIC CIRRHOSIS, UNSPECIFIED HEPATIC CIRRHOSIS TYPE, UNSPECIFIED WHETHER ASCITES PRESENT: ICD-10-CM

## 2022-04-27 DIAGNOSIS — R16.1 SPLENOMEGALY: ICD-10-CM

## 2022-04-27 DIAGNOSIS — R18.8 OTHER ASCITES: ICD-10-CM

## 2022-04-27 DIAGNOSIS — I85.00 IDIOPATHIC ESOPHAGEAL VARICES WITHOUT BLEEDING: ICD-10-CM

## 2022-04-27 PROCEDURE — 99215 OFFICE O/P EST HI 40 MIN: CPT | Performed by: INTERNAL MEDICINE

## 2022-04-27 RX ORDER — NADOLOL 20 MG/1
TAKE ONE TABLET BY MOUTH DAILY TABLET ORAL
Qty: 90 TABLET | Refills: 1 | Status: ACTIVE | COMMUNITY

## 2022-04-27 RX ORDER — FUROSEMIDE 40 MG/1
1 TABLET TABLET ORAL ONCE A DAY
Qty: 30 TABLET | Refills: 1

## 2022-04-27 RX ORDER — NADOLOL 20 MG/1
TAKE ONE TABLET BY MOUTH DAILY TABLET ORAL
Qty: 75

## 2022-04-27 RX ORDER — SPIRONOLACTONE 50 MG/1
1 TABLET TABLET, FILM COATED ORAL ONCE A DAY
Qty: 30 TABLET | Refills: 1

## 2022-04-27 RX ORDER — FUROSEMIDE 20 MG/1
TAKE ONE TABLET BY MOUTH DAILY TABLET ORAL
Qty: 90 TABLET | Refills: 1 | Status: ACTIVE | COMMUNITY

## 2022-04-27 RX ORDER — SPIRONOLACTONE 25 MG/1
TAKE ONE TABLET BY MOUTH DAILY TABLET, FILM COATED ORAL
Qty: 90 TABLET | Refills: 1 | Status: ACTIVE | COMMUNITY

## 2022-04-27 RX ORDER — VALSARTAN 160 MG/1
TABLET ORAL
Qty: 0 | Refills: 0 | Status: ON HOLD | COMMUNITY
Start: 1900-01-01

## 2022-04-27 RX ORDER — CANAGLIFLOZIN 100 MG/1
TAKE 1 TABLET (100 MG) BY ORAL ROUTE ONCE DAILY BEFORE THE FIRST MEAL OF THE DAY TABLET, FILM COATED ORAL 1
Qty: 0 | Refills: 0 | Status: ON HOLD | COMMUNITY
Start: 1900-01-01

## 2022-04-27 RX ORDER — FUROSEMIDE 20 MG/1
TAKE ONE TABLET BY MOUTH DAILY TABLET ORAL
Qty: 90 | Refills: 0 | Status: ACTIVE | COMMUNITY

## 2022-04-27 RX ORDER — NADOLOL 20 MG/1
TAKE ONE TABLET BY MOUTH DAILY TABLET ORAL
Qty: 75 | Status: ACTIVE | COMMUNITY

## 2022-04-27 RX ORDER — SITAGLIPTIN AND METFORMIN HYDROCHLORIDE 50; 1000 MG/1; MG/1
TAKE 1 TABLET BY ORAL ROUTE 2 TIMES PER DAY WITH MEALS TABLET, FILM COATED ORAL 2
Qty: 0 | Refills: 0 | Status: ACTIVE | COMMUNITY
Start: 1900-01-01

## 2022-05-06 ENCOUNTER — OFFICE VISIT (OUTPATIENT)
Dept: URBAN - METROPOLITAN AREA CLINIC 78 | Facility: CLINIC | Age: 87
End: 2022-05-06
Payer: MEDICARE

## 2022-05-06 DIAGNOSIS — R16.1 SPLENOMEGALY: ICD-10-CM

## 2022-05-06 DIAGNOSIS — R18.8 OTHER ASCITES: ICD-10-CM

## 2022-05-06 DIAGNOSIS — D69.6 THROMBOCYTOPENIA: ICD-10-CM

## 2022-05-06 DIAGNOSIS — K57.90 DIVERTICULOSIS: ICD-10-CM

## 2022-05-06 DIAGNOSIS — K31.89 OTHER DISEASES OF STOMACH AND DUODENUM: ICD-10-CM

## 2022-05-06 DIAGNOSIS — R60.0 PEDAL EDEMA: ICD-10-CM

## 2022-05-06 DIAGNOSIS — K76.6 PORTAL HYPERTENSION: ICD-10-CM

## 2022-05-06 DIAGNOSIS — I85.10 SECONDARY ESOPHAGEAL VARICES WITHOUT BLEEDING: ICD-10-CM

## 2022-05-06 DIAGNOSIS — K74.69 OTHER CIRRHOSIS OF LIVER: ICD-10-CM

## 2022-05-06 PROCEDURE — 99214 OFFICE O/P EST MOD 30 MIN: CPT | Performed by: INTERNAL MEDICINE

## 2022-05-06 RX ORDER — SITAGLIPTIN AND METFORMIN HYDROCHLORIDE 50; 1000 MG/1; MG/1
TAKE 1 TABLET BY ORAL ROUTE 2 TIMES PER DAY WITH MEALS TABLET, FILM COATED ORAL 2
Qty: 0 | Refills: 0 | Status: ACTIVE | COMMUNITY
Start: 1900-01-01

## 2022-05-06 RX ORDER — NADOLOL 20 MG/1
TAKE ONE TABLET BY MOUTH DAILY TABLET ORAL
Qty: 90 TABLET | Refills: 1 | Status: ACTIVE | COMMUNITY

## 2022-05-06 RX ORDER — FUROSEMIDE 20 MG/1
TAKE ONE TABLET BY MOUTH DAILY TABLET ORAL
Qty: 90 | Refills: 0 | Status: ACTIVE | COMMUNITY

## 2022-05-06 RX ORDER — NADOLOL 20 MG/1
TAKE ONE TABLET BY MOUTH DAILY TABLET ORAL
Qty: 75 | Status: ACTIVE | COMMUNITY

## 2022-05-06 RX ORDER — CANAGLIFLOZIN 100 MG/1
TAKE 1 TABLET (100 MG) BY ORAL ROUTE ONCE DAILY BEFORE THE FIRST MEAL OF THE DAY TABLET, FILM COATED ORAL 1
Qty: 0 | Refills: 0 | Status: ON HOLD | COMMUNITY
Start: 1900-01-01

## 2022-05-06 RX ORDER — NADOLOL 20 MG/1
TAKE ONE TABLET BY MOUTH DAILY TABLET ORAL
Qty: 75

## 2022-05-06 RX ORDER — SPIRONOLACTONE 50 MG/1
1 TABLET TABLET, FILM COATED ORAL ONCE A DAY
Qty: 30 TABLET | Refills: 1

## 2022-05-06 RX ORDER — SPIRONOLACTONE 50 MG/1
1 TABLET TABLET, FILM COATED ORAL ONCE A DAY
Qty: 30 TABLET | Refills: 1 | Status: ACTIVE | COMMUNITY

## 2022-05-06 RX ORDER — SPIRONOLACTONE 25 MG/1
TAKE ONE TABLET BY MOUTH DAILY TABLET, FILM COATED ORAL
Qty: 90 TABLET | Refills: 1 | Status: ACTIVE | COMMUNITY

## 2022-05-06 RX ORDER — FUROSEMIDE 20 MG/1
TAKE ONE TABLET BY MOUTH DAILY TABLET ORAL
Qty: 90 TABLET | Refills: 1 | Status: ACTIVE | COMMUNITY

## 2022-05-06 RX ORDER — VALSARTAN 160 MG/1
TABLET ORAL
Qty: 0 | Refills: 0 | Status: ON HOLD | COMMUNITY
Start: 1900-01-01

## 2022-05-06 RX ORDER — FUROSEMIDE 40 MG/1
1 TABLET TABLET ORAL ONCE A DAY
Qty: 30 TABLET | Refills: 1 | Status: ACTIVE | COMMUNITY

## 2022-05-06 RX ORDER — FUROSEMIDE 40 MG/1
1 TABLET TABLET ORAL ONCE A DAY
Qty: 30 TABLET | Refills: 1

## 2022-05-06 NOTE — HPI-TODAY'S VISIT:
Patient is here in f/u He is here with his wife AN  service was used  He was recently adm to the [facility] hsevere anemia and rectal bleed Flex sig showed rectal varices He underwent embolization of the superior rectal artery He also recived 2 units of blood  He was then admitted for worsening ascites He was found to be pancytopenic Paracentesis was held off  He now presents with worsening edema feet and ascites  His duretic dose was increased He says he lost 6 lbs since 4/27 He is concerned

## 2022-05-27 ENCOUNTER — LAB OUTSIDE AN ENCOUNTER (OUTPATIENT)
Dept: URBAN - METROPOLITAN AREA CLINIC 78 | Facility: CLINIC | Age: 87
End: 2022-05-27

## 2022-05-27 ENCOUNTER — CLAIMS CREATED FROM THE CLAIM WINDOW (OUTPATIENT)
Dept: URBAN - METROPOLITAN AREA CLINIC 78 | Facility: CLINIC | Age: 87
End: 2022-05-27
Payer: MEDICARE

## 2022-05-27 DIAGNOSIS — R60.0 PEDAL EDEMA: ICD-10-CM

## 2022-05-27 DIAGNOSIS — K74.60 HEPATIC CIRRHOSIS, UNSPECIFIED HEPATIC CIRRHOSIS TYPE, UNSPECIFIED WHETHER ASCITES PRESENT: ICD-10-CM

## 2022-05-27 DIAGNOSIS — R16.1 SPLENOMEGALY: ICD-10-CM

## 2022-05-27 DIAGNOSIS — I85.10 SECONDARY ESOPHAGEAL VARICES WITHOUT BLEEDING: ICD-10-CM

## 2022-05-27 DIAGNOSIS — K64.9 RECTAL VARICES: ICD-10-CM

## 2022-05-27 DIAGNOSIS — K31.89 OTHER DISEASES OF STOMACH AND DUODENUM: ICD-10-CM

## 2022-05-27 DIAGNOSIS — R18.8 OTHER ASCITES: ICD-10-CM

## 2022-05-27 DIAGNOSIS — D69.6 THROMBOCYTOPENIA: ICD-10-CM

## 2022-05-27 DIAGNOSIS — K76.6 PORTAL HYPERTENSION: ICD-10-CM

## 2022-05-27 DIAGNOSIS — K62.5 BRBPR (BRIGHT RED BLOOD PER RECTUM): ICD-10-CM

## 2022-05-27 DIAGNOSIS — D64.9 ANEMIA: ICD-10-CM

## 2022-05-27 DIAGNOSIS — Z85.46 HISTORY OF PROSTATE CANCER: ICD-10-CM

## 2022-05-27 DIAGNOSIS — Z86.010 PERSONAL HISTORY OF COLONIC POLYPS: ICD-10-CM

## 2022-05-27 DIAGNOSIS — K57.90 DIVERTICULOSIS: ICD-10-CM

## 2022-05-27 DIAGNOSIS — Z85.79 HISTORY OF LYMPHOMA: ICD-10-CM

## 2022-05-27 DIAGNOSIS — D50.8 ACQUIRED IRON DEFICIENCY ANEMIA DUE TO DECREASED ABSORPTION: ICD-10-CM

## 2022-05-27 DIAGNOSIS — K74.69 CIRRHOSIS, CRYPTOGENIC: ICD-10-CM

## 2022-05-27 DIAGNOSIS — I85.00 IDIOPATHIC ESOPHAGEAL VARICES WITHOUT BLEEDING: ICD-10-CM

## 2022-05-27 PROBLEM — 19943007: Status: ACTIVE | Noted: 2020-06-16

## 2022-05-27 PROBLEM — 428262008: Status: ACTIVE | Noted: 2020-06-16

## 2022-05-27 PROBLEM — 428283002: Status: ACTIVE | Noted: 2020-06-16

## 2022-05-27 PROBLEM — 429014004: Status: ACTIVE | Noted: 2020-06-16

## 2022-05-27 PROCEDURE — 99214 OFFICE O/P EST MOD 30 MIN: CPT | Performed by: INTERNAL MEDICINE

## 2022-05-27 RX ORDER — SITAGLIPTIN AND METFORMIN HYDROCHLORIDE 50; 1000 MG/1; MG/1
TAKE 1 TABLET BY ORAL ROUTE 2 TIMES PER DAY WITH MEALS TABLET, FILM COATED ORAL 2
Qty: 0 | Refills: 0 | Status: ACTIVE | COMMUNITY
Start: 1900-01-01

## 2022-05-27 RX ORDER — SPIRONOLACTONE 50 MG/1
1 TABLET TABLET, FILM COATED ORAL ONCE A DAY
Qty: 30 TABLET | Refills: 1 | Status: ACTIVE | COMMUNITY

## 2022-05-27 RX ORDER — SPIRONOLACTONE 50 MG/1
1 TABLET TABLET, FILM COATED ORAL ONCE A DAY
Qty: 30 TABLET | Refills: 1

## 2022-05-27 RX ORDER — FUROSEMIDE 40 MG/1
1 TABLET TABLET ORAL ONCE A DAY
Qty: 30 TABLET | Refills: 1 | Status: ACTIVE | COMMUNITY

## 2022-05-27 RX ORDER — FUROSEMIDE 20 MG/1
TAKE ONE TABLET BY MOUTH DAILY TABLET ORAL
Qty: 90 | Refills: 0 | Status: ACTIVE | COMMUNITY

## 2022-05-27 RX ORDER — NADOLOL 20 MG/1
TAKE ONE TABLET BY MOUTH DAILY TABLET ORAL
Qty: 90 TABLET | Refills: 1 | Status: ACTIVE | COMMUNITY

## 2022-05-27 RX ORDER — NADOLOL 20 MG/1
TAKE ONE TABLET BY MOUTH DAILY TABLET ORAL
Qty: 75 | Status: ACTIVE | COMMUNITY

## 2022-05-27 RX ORDER — VALSARTAN 160 MG/1
TABLET ORAL
Qty: 0 | Refills: 0 | Status: ON HOLD | COMMUNITY
Start: 1900-01-01

## 2022-05-27 RX ORDER — FUROSEMIDE 40 MG/1
1 TABLET TABLET ORAL ONCE A DAY
Qty: 30 TABLET | Refills: 1

## 2022-05-27 RX ORDER — FUROSEMIDE 20 MG/1
TAKE ONE TABLET BY MOUTH DAILY TABLET ORAL
Qty: 90 TABLET | Refills: 1 | Status: ACTIVE | COMMUNITY

## 2022-05-27 RX ORDER — CANAGLIFLOZIN 100 MG/1
TAKE 1 TABLET (100 MG) BY ORAL ROUTE ONCE DAILY BEFORE THE FIRST MEAL OF THE DAY TABLET, FILM COATED ORAL 1
Qty: 0 | Refills: 0 | Status: ON HOLD | COMMUNITY
Start: 1900-01-01

## 2022-05-27 RX ORDER — NADOLOL 20 MG/1
TAKE ONE TABLET BY MOUTH DAILY TABLET ORAL
Qty: 75

## 2022-05-27 RX ORDER — SPIRONOLACTONE 25 MG/1
TAKE ONE TABLET BY MOUTH DAILY TABLET, FILM COATED ORAL
Qty: 90 TABLET | Refills: 1 | Status: ACTIVE | COMMUNITY

## 2022-05-27 NOTE — HPI-TODAY'S VISIT:
Patient is here in f/u He is here with his daughter He says that 2 weeks ago he had one episode of BRBPR He was recently adm to the [facility] wit hsevere anemia and rectal bleed Flex sig showed rectal varices He underwent embolization of the superior rectal artery He also recived 2 units of blood It did not recur after that Ascites and edema feet is present Patient claims he takes the diuretics as recommended but he is not getting enough urine output He has gained 4 lbs of wt since the last visit  ----------------------------------------------------------------------------------------------------------------   He was then admitted for worsening ascites He was found to be pancytopenic Paracentesis was held off  He now presents with worsening edema feet and ascites  His duretic dose was increased He says he lost 6 lbs since 4/27 He is concerned

## 2022-06-16 PROBLEM — 266468003 CIRRHOSIS - NON-ALCOHOLIC: Status: ACTIVE | Noted: 2022-06-16

## 2022-06-16 PROBLEM — 415116008: Status: ACTIVE | Noted: 2022-04-27

## 2022-06-16 PROBLEM — 16294009: Status: ACTIVE | Noted: 2020-06-16

## 2022-06-16 PROBLEM — 724557008 ACQUIRED IRON DEFICIENCY ANEMIA DUE TO DECREASED ABSORPTION: Status: ACTIVE | Noted: 2022-06-16

## 2022-06-16 PROBLEM — 14223005 OESOPHAGEAL VARICES WITHOUT BLEEDING: Status: ACTIVE | Noted: 2022-06-16

## 2022-06-16 PROBLEM — 397881000: Status: ACTIVE | Noted: 2020-06-16

## 2022-06-16 PROBLEM — 119291004: Status: ACTIVE | Noted: 2020-06-16

## 2022-06-16 PROBLEM — 102576009: Status: ACTIVE | Noted: 2020-06-16

## 2022-06-16 PROBLEM — 34742003: Status: ACTIVE | Noted: 2020-06-16

## 2022-06-22 PROBLEM — 14223005: Status: ACTIVE | Noted: 2020-06-16

## 2022-07-26 ENCOUNTER — ERX REFILL RESPONSE (OUTPATIENT)
Dept: URBAN - METROPOLITAN AREA CLINIC 78 | Facility: CLINIC | Age: 87
End: 2022-07-26

## 2022-07-26 RX ORDER — PANTOPRAZOLE 20 MG/1
TAKE ONE TABLET BY MOUTH DAILY TABLET, DELAYED RELEASE ORAL
Qty: 90 TABLET | Refills: 0 | OUTPATIENT

## 2022-08-02 ENCOUNTER — LAB OUTSIDE AN ENCOUNTER (OUTPATIENT)
Dept: URBAN - METROPOLITAN AREA CLINIC 98 | Facility: CLINIC | Age: 87
End: 2022-08-02

## 2022-08-02 ENCOUNTER — TELEPHONE ENCOUNTER (OUTPATIENT)
Dept: URBAN - METROPOLITAN AREA CLINIC 98 | Facility: CLINIC | Age: 87
End: 2022-08-02

## 2022-08-02 ENCOUNTER — WEB ENCOUNTER (OUTPATIENT)
Dept: URBAN - METROPOLITAN AREA CLINIC 98 | Facility: CLINIC | Age: 87
End: 2022-08-02

## 2022-08-02 ENCOUNTER — OFFICE VISIT (OUTPATIENT)
Dept: URBAN - METROPOLITAN AREA CLINIC 98 | Facility: CLINIC | Age: 87
End: 2022-08-02
Payer: MEDICARE

## 2022-08-02 VITALS
BODY MASS INDEX: 22.47 KG/M2 | WEIGHT: 139.8 LBS | HEIGHT: 66 IN | SYSTOLIC BLOOD PRESSURE: 137 MMHG | TEMPERATURE: 97 F | DIASTOLIC BLOOD PRESSURE: 61 MMHG

## 2022-08-02 DIAGNOSIS — K72.90 HEPATIC ENCEPHALOPATHY: ICD-10-CM

## 2022-08-02 DIAGNOSIS — Z86.39 HISTORY OF DIABETES MELLITUS, TYPE II: ICD-10-CM

## 2022-08-02 DIAGNOSIS — Z85.038 HISTORY OF COLON CANCER: ICD-10-CM

## 2022-08-02 DIAGNOSIS — R18.8 ASCITES OF LIVER: ICD-10-CM

## 2022-08-02 DIAGNOSIS — K74.69 CRYPTOGENIC CIRRHOSIS: ICD-10-CM

## 2022-08-02 PROCEDURE — 99214 OFFICE O/P EST MOD 30 MIN: CPT | Performed by: INTERNAL MEDICINE

## 2022-08-02 RX ORDER — FUROSEMIDE 40 MG/1
1 TABLET TABLET ORAL ONCE A DAY
Qty: 30 TABLET | Refills: 1 | Status: ACTIVE | COMMUNITY

## 2022-08-02 RX ORDER — SITAGLIPTIN AND METFORMIN HYDROCHLORIDE 50; 1000 MG/1; MG/1
TAKE 1 TABLET BY ORAL ROUTE 2 TIMES PER DAY WITH MEALS TABLET, FILM COATED ORAL 2
Qty: 0 | Refills: 0 | Status: ACTIVE | COMMUNITY
Start: 1900-01-01

## 2022-08-02 RX ORDER — SPIRONOLACTONE 50 MG/1
1 TABLET TABLET, FILM COATED ORAL ONCE A DAY
Qty: 30 TABLET | Refills: 1 | Status: ACTIVE | COMMUNITY

## 2022-08-02 RX ORDER — LACTULOSE 10 G/15ML
15 ML SOLUTION ORAL BID
Qty: 2700 ML | Refills: 1 | OUTPATIENT

## 2022-08-02 RX ORDER — NADOLOL 20 MG/1
TAKE ONE TABLET BY MOUTH DAILY TABLET ORAL
Qty: 75 | Status: ACTIVE | COMMUNITY

## 2022-08-02 RX ORDER — PANTOPRAZOLE 20 MG/1
TAKE ONE TABLET BY MOUTH DAILY TABLET, DELAYED RELEASE ORAL
Qty: 90 TABLET | Refills: 0 | Status: ACTIVE | COMMUNITY

## 2022-08-02 RX ORDER — CANAGLIFLOZIN 100 MG/1
TAKE 1 TABLET (100 MG) BY ORAL ROUTE ONCE DAILY BEFORE THE FIRST MEAL OF THE DAY TABLET, FILM COATED ORAL 1
Qty: 0 | Refills: 0 | Status: ON HOLD | COMMUNITY
Start: 1900-01-01

## 2022-08-02 RX ORDER — VALSARTAN 160 MG/1
TABLET ORAL
Qty: 0 | Refills: 0 | Status: ON HOLD | COMMUNITY
Start: 1900-01-01

## 2022-08-02 NOTE — HPI-OTHER HISTORIES
EXAM: CT Abdomen + Pelvis w/ IV Contrast FEB 2022  CLINICAL INDICATION: 86m pmhgx lymphoma p/w 1 day fever, abdo distention, diarrhea;Diarrhea. fever, hx lymphoma  TECHNIQUE: Following administration of non-ionic IV contrast, postcontrast images through the abdomen and pelvis were obtained. [id number] If applicable, point-of-care testing was approved following departmental protocol.  COMPARISON: MRI abdomen 5/3/2018  FINDINGS: This study was read on an emergent preliminary basis by radiologist on call at time of exam. Final report is as follows.  Lower Thorax: Similar heart size without pericardial effusion. Right atrial right ventricular pacing leads present. Trace right greater than left pleural effusions with subsegmental atelectasis. Calcified granuloma right base. Some vague groundglass and consolidative type changes right base which may reflect aspiration and/or infection. Large paraesophageal varices.  Liver: Morphologic changes of chronic parenchymal disease with parenchymal lobar redistribution and contour nodularity. Hepatic hypodensities corresponding to previously demonstrated cysts\biliary hamartomas. No convincing suspicious hepatic lesion.  Gallbladder/Biliary Tree: Cholelithiasis without biliary ductal dilatation.  Spleen: Redemonstration of splenomegaly measuring 17.5 cm  Pancreas: No duct dilatation or enhancing mass  Adrenal Glands: Similar left adrenal nodule measuring approximately 1.7 CM  Kidneys/Ureters: Symmetrically perfuse. No hydronephrosis. Bilateral renal hypodensities which are incompletely characterized but statistically cysts  Gastrointestinal: No abnormal dilatation. Multiple loops of thick-walled small bowel predominating in the left abdomen (series 2 image 63). Normal appendix. Nonspecific irregular rectal wall thickening (series 2 image 93).  Bladder: Predominantly decompressed  Prostate/Seminal Vesicles: Prostate is not enlarged.  Lymph Nodes: Predominantly subcentimeter short axis scattered lymph nodes most commonly reactive in etiology.  Vessels: Mild to moderate atherosclerotic plaquing of the aorta without aortic aneurysm. Main portal, splenic and superior mesenteric veins are patent. Markedly enlarged paraesophageal varices measuring up to 2.5 cm in diameter (series 2 image 19)  Peritoneum/Retroperitoneum: Small-volume ascites. No organized drainable collection or gas containing collection to suggest black abscess formation. Diffuse mesenteric edema type pattern.  Bones/Soft Tissues: No aggressive osseous lesion. Moderate spondylosis. 3 mm retrolisthesis L3 on L4. Stable sclerotic focus in the right iliac bone compared to 2016 and most compatible with a bone island. Few additional similar sclerotic foci similarly favored to reflect bone islands in the appropriate clinical setting.  IMPRESSION:   1. Hepatic cirrhosis with patent main portal vein 2. Portal hypertension manifest as splenomegaly, varices, and small volume ascites. 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 3. No convincing suspicious hepatic lesion. Nonemergent contrast MRI follow-up for HCC surveillance is recommended as prior outside imaging and clinical situation directs. 4. Multiple loops of thick-walled small bowel, nonspecific, but may reflect infectious\inflammatory change. 5. Nonspecific irregular rectal wall thickening measuring up to approximately 1.9 cm (series 601B image 85). Suggest GI consult for correlation with direct visualization since malignancy is a differential concern. 6. Cholelithiasis without biliary ductal dilatation. 7. Similar size left adrenal nodule to MRI 2018

## 2022-08-02 NOTE — HPI-TODAY'S VISIT:
Pt of Dr Cantu, here today  w son to re-establish hepatology care.  Seen by myself and Dr Membreno 5yrs ago.   Has a remote history of colon CA, and cryptogenic cirrhosis (Presumed [place]) with recent  h/o rectal varices embolized for rectal bleeding 4/2022. (flex sig negative for mass or polyp)  liver disease decompensated with ascites as well as GI bleeding and now confusion . Daughter in [place] Son in [place] but here to help father.   lives with wife (Mandarin speaker); living 15 min away from daughter,   . feeling tired and low energy eating well  BM are good having confusion - more than before - unsteady on feet  no more bleeding they don't have medication list but pt reports diuretics : now 40mg furosemide / 50 mg spironolactone  nadolol evening no diarrhea or constipation  . getting ginseng to help memory.   recent lethargy -  when trial dc Janumet.  Janumet re-instated, BG improving

## 2022-08-03 ENCOUNTER — TELEPHONE ENCOUNTER (OUTPATIENT)
Dept: URBAN - METROPOLITAN AREA CLINIC 98 | Facility: CLINIC | Age: 87
End: 2022-08-03

## 2022-08-03 LAB
A/G RATIO: 1.2
ABSOLUTE BASOPHILS: 7
ABSOLUTE EOSINOPHILS: 0
ABSOLUTE LYMPHOCYTES: 637
ABSOLUTE MONOCYTES: 602
ABSOLUTE NEUTROPHILS: 5754
AFP, SERUM, TUMOR MARKER: 2.7
ALBUMIN: 3.2
ALKALINE PHOSPHATASE: 52
ALT (SGPT): 10
AST (SGOT): 20
BASOPHILS: 0.1
BILIRUBIN, TOTAL: 2.8
BUN/CREATININE RATIO: 29
BUN: 30
CALCIUM: 8.3
CARBON DIOXIDE, TOTAL: 21
CBC MORPHOLOGY: (no result)
CHLORIDE: 102
COMMENT(S): (no result)
CREATININE: 1.02
EGFR: 72
EOSINOPHILS: 0
FERRITIN, SERUM: 69
GLOBULIN, TOTAL: 2.6
GLUCOSE: 174
HEMATOCRIT: 16.6
HEMOGLOBIN: 5.6
INR: 1
IRON BIND.CAP.(TIBC): 315
IRON SATURATION: 16
IRON: 50
LYMPHOCYTES: 9.1
MCH: 35
MCHC: 33.7
MCV: 103.8
MONOCYTES: 8.6
MPV: 13.3
NEUTROPHILS: 82.2
PLATELET COUNT: 45
POTASSIUM: 4.1
PROTEIN, TOTAL: 5.8
PT: 10.8
RDW: 17.7
RED BLOOD CELL COUNT: 1.6
SODIUM: 133
WHITE BLOOD CELL COUNT: 7

## 2022-08-05 ENCOUNTER — OUT OF OFFICE VISIT (OUTPATIENT)
Dept: URBAN - METROPOLITAN AREA MEDICAL CENTER 10 | Facility: MEDICAL CENTER | Age: 87
End: 2022-08-05
Payer: MEDICARE

## 2022-08-05 DIAGNOSIS — D61.818 OTHER PANCYTOPENIA: ICD-10-CM

## 2022-08-05 DIAGNOSIS — D64.89 ANEMIA DUE TO OTHER CAUSE: ICD-10-CM

## 2022-08-05 PROCEDURE — G8427 DOCREV CUR MEDS BY ELIG CLIN: HCPCS | Performed by: INTERNAL MEDICINE

## 2022-08-05 PROCEDURE — 99222 1ST HOSP IP/OBS MODERATE 55: CPT | Performed by: INTERNAL MEDICINE

## 2022-08-08 ENCOUNTER — OFFICE VISIT (OUTPATIENT)
Dept: URBAN - METROPOLITAN AREA MEDICAL CENTER 10 | Facility: MEDICAL CENTER | Age: 87
End: 2022-08-08

## 2022-08-10 ENCOUNTER — TELEPHONE ENCOUNTER (OUTPATIENT)
Dept: URBAN - METROPOLITAN AREA CLINIC 98 | Facility: CLINIC | Age: 87
End: 2022-08-10

## 2022-08-15 ENCOUNTER — LAB OUTSIDE AN ENCOUNTER (OUTPATIENT)
Dept: URBAN - METROPOLITAN AREA CLINIC 98 | Facility: CLINIC | Age: 87
End: 2022-08-15

## 2022-08-15 ENCOUNTER — TELEPHONE ENCOUNTER (OUTPATIENT)
Dept: URBAN - METROPOLITAN AREA CLINIC 98 | Facility: CLINIC | Age: 87
End: 2022-08-15

## 2022-08-15 PROBLEM — 271737000 ANEMIA: Status: ACTIVE | Noted: 2020-06-16

## 2022-08-17 ENCOUNTER — ERX REFILL RESPONSE (OUTPATIENT)
Dept: URBAN - METROPOLITAN AREA CLINIC 78 | Facility: CLINIC | Age: 87
End: 2022-08-17

## 2022-08-17 RX ORDER — FUROSEMIDE 20 MG/1
TAKE ONE TABLET BY MOUTH DAILY TABLET ORAL
Qty: 90 TABLET | Refills: 0 | OUTPATIENT

## 2022-09-06 ENCOUNTER — LAB OUTSIDE AN ENCOUNTER (OUTPATIENT)
Dept: URBAN - METROPOLITAN AREA CLINIC 98 | Facility: CLINIC | Age: 87
End: 2022-09-06

## 2022-09-07 ENCOUNTER — TELEPHONE ENCOUNTER (OUTPATIENT)
Dept: URBAN - METROPOLITAN AREA CLINIC 98 | Facility: CLINIC | Age: 87
End: 2022-09-07

## 2022-09-07 ENCOUNTER — TELEPHONE ENCOUNTER (OUTPATIENT)
Dept: URBAN - METROPOLITAN AREA CLINIC 118 | Facility: CLINIC | Age: 87
End: 2022-09-07

## 2022-09-07 LAB
BASO (ABSOLUTE): 0
BASOS: 0
EOS (ABSOLUTE): 0
EOS: 0
FERRITIN, SERUM: 78
HEMATOCRIT: 18.7
HEMATOLOGY COMMENTS:: (no result)
HEMOGLOBIN: 5.9
IMMATURE CELLS: (no result)
IMMATURE GRANS (ABS): 0
IMMATURE GRANULOCYTES: 1
IRON BIND.CAP.(TIBC): 297
IRON SATURATION: 12
IRON: 35
LYMPHS (ABSOLUTE): 0.5
LYMPHS: 15
MCH: 32.6
MCHC: 31.6
MCV: 103
MONOCYTES(ABSOLUTE): 0.4
MONOCYTES: 12
NEUTROPHILS (ABSOLUTE): 2.3
NEUTROPHILS: 72
NRBC: 1
PLATELETS: 73
RBC: 1.81
RDW: 18.1
UIBC: 262
WBC: 3.2

## 2022-09-08 ENCOUNTER — TELEPHONE ENCOUNTER (OUTPATIENT)
Dept: URBAN - METROPOLITAN AREA CLINIC 6 | Facility: CLINIC | Age: 87
End: 2022-09-08

## 2022-09-09 ENCOUNTER — LAB OUTSIDE AN ENCOUNTER (OUTPATIENT)
Dept: URBAN - METROPOLITAN AREA CLINIC 98 | Facility: CLINIC | Age: 87
End: 2022-09-09

## 2022-09-09 ENCOUNTER — OFFICE VISIT (OUTPATIENT)
Dept: URBAN - METROPOLITAN AREA CLINIC 98 | Facility: CLINIC | Age: 87
End: 2022-09-09
Payer: MEDICARE

## 2022-09-09 VITALS
HEART RATE: 83 BPM | HEIGHT: 66 IN | TEMPERATURE: 97.2 F | DIASTOLIC BLOOD PRESSURE: 76 MMHG | BODY MASS INDEX: 22.66 KG/M2 | SYSTOLIC BLOOD PRESSURE: 149 MMHG | WEIGHT: 141 LBS

## 2022-09-09 DIAGNOSIS — Z85.038 HISTORY OF COLON CANCER: ICD-10-CM

## 2022-09-09 DIAGNOSIS — K74.69 CRYPTOGENIC CIRRHOSIS: ICD-10-CM

## 2022-09-09 DIAGNOSIS — K72.90 HEPATIC ENCEPHALOPATHY: ICD-10-CM

## 2022-09-09 DIAGNOSIS — Z86.39 HISTORY OF DIABETES MELLITUS, TYPE II: ICD-10-CM

## 2022-09-09 DIAGNOSIS — R18.8 ASCITES OF LIVER: ICD-10-CM

## 2022-09-09 PROCEDURE — 99214 OFFICE O/P EST MOD 30 MIN: CPT | Performed by: INTERNAL MEDICINE

## 2022-09-09 RX ORDER — CANAGLIFLOZIN 100 MG/1
TAKE 1 TABLET (100 MG) BY ORAL ROUTE ONCE DAILY BEFORE THE FIRST MEAL OF THE DAY TABLET, FILM COATED ORAL 1
Qty: 0 | Refills: 0 | Status: ON HOLD | COMMUNITY
Start: 1900-01-01

## 2022-09-09 RX ORDER — VALSARTAN 160 MG/1
TABLET ORAL
Qty: 0 | Refills: 0 | Status: ON HOLD | COMMUNITY
Start: 1900-01-01

## 2022-09-09 RX ORDER — SPIRONOLACTONE 50 MG/1
1 TABLET TABLET, FILM COATED ORAL TWICE A DAY
Qty: 60 TABLET | Refills: 1

## 2022-09-09 RX ORDER — NADOLOL 20 MG/1
TAKE ONE TABLET BY MOUTH DAILY TABLET ORAL
Qty: 75 | Status: ACTIVE | COMMUNITY

## 2022-09-09 RX ORDER — METFORMIN HYDROCHLORIDE 500 MG/1
1 TABLET WITH A MEAL TABLET, FILM COATED ORAL ONCE A DAY
Status: ACTIVE | COMMUNITY

## 2022-09-09 RX ORDER — FUROSEMIDE 40 MG/1
1 TABLET TABLET ORAL TWICE A DAY
Qty: 60 TABLET | Refills: 1

## 2022-09-09 RX ORDER — SITAGLIPTIN AND METFORMIN HYDROCHLORIDE 50; 1000 MG/1; MG/1
TAKE 1 TABLET BY ORAL ROUTE 2 TIMES PER DAY WITH MEALS TABLET, FILM COATED ORAL 2
Qty: 0 | Refills: 0 | Status: ACTIVE | COMMUNITY
Start: 1900-01-01

## 2022-09-09 RX ORDER — SPIRONOLACTONE 50 MG/1
1 TABLET TABLET, FILM COATED ORAL ONCE A DAY
Qty: 30 TABLET | Refills: 1 | Status: ACTIVE | COMMUNITY

## 2022-09-09 RX ORDER — PANTOPRAZOLE 20 MG/1
TAKE ONE TABLET BY MOUTH DAILY TABLET, DELAYED RELEASE ORAL
Qty: 90 TABLET | Refills: 0 | Status: ACTIVE | COMMUNITY

## 2022-09-09 RX ORDER — LACTULOSE 10 G/15ML
15 ML SOLUTION ORAL BID
Qty: 2700 ML | Refills: 1 | OUTPATIENT

## 2022-09-09 RX ORDER — FUROSEMIDE 40 MG/1
1 TABLET TABLET ORAL ONCE A DAY
Qty: 30 TABLET | Refills: 1 | Status: ACTIVE | COMMUNITY

## 2022-09-09 RX ORDER — LACTULOSE 10 G/15ML
15 ML SOLUTION ORAL BID
Qty: 2700 ML | Refills: 1 | Status: ACTIVE | COMMUNITY

## 2022-09-09 RX ORDER — FUROSEMIDE 20 MG/1
TAKE ONE TABLET BY MOUTH DAILY TABLET ORAL
Qty: 90 TABLET | Refills: 0 | Status: ACTIVE | COMMUNITY

## 2022-09-09 NOTE — HPI-TODAY'S VISIT:
seen last month in office to re-establish care of his liver disease.  found to have severe anemia : sent to [facility] and got a transfusion;  however pre-clinic labs showed recurrent anemia - so he was called and went back to [facility] : got 2 unit pRBC overnight and sent home  recent EGD 2/2022 w/o obvious cause for bleeding; at the time he also had a flex sig and rectal varices noted.   denies overt GI bleeding  feels that ascites is worsening

## 2022-10-07 ENCOUNTER — LAB OUTSIDE AN ENCOUNTER (OUTPATIENT)
Dept: URBAN - METROPOLITAN AREA CLINIC 98 | Facility: CLINIC | Age: 87
End: 2022-10-07

## 2022-10-13 ENCOUNTER — OFFICE VISIT (OUTPATIENT)
Dept: URBAN - METROPOLITAN AREA CLINIC 98 | Facility: CLINIC | Age: 87
End: 2022-10-13

## 2022-10-31 ENCOUNTER — LAB OUTSIDE AN ENCOUNTER (OUTPATIENT)
Dept: URBAN - METROPOLITAN AREA CLINIC 98 | Facility: CLINIC | Age: 87
End: 2022-10-31

## 2022-11-01 ENCOUNTER — OFFICE VISIT (OUTPATIENT)
Dept: URBAN - METROPOLITAN AREA CLINIC 98 | Facility: CLINIC | Age: 87
End: 2022-11-01
Payer: MEDICARE

## 2022-11-01 ENCOUNTER — LAB OUTSIDE AN ENCOUNTER (OUTPATIENT)
Dept: URBAN - METROPOLITAN AREA CLINIC 98 | Facility: CLINIC | Age: 87
End: 2022-11-01

## 2022-11-01 VITALS
BODY MASS INDEX: 20.09 KG/M2 | WEIGHT: 125 LBS | HEIGHT: 66 IN | HEART RATE: 68 BPM | TEMPERATURE: 97.5 F | DIASTOLIC BLOOD PRESSURE: 47 MMHG | SYSTOLIC BLOOD PRESSURE: 97 MMHG

## 2022-11-01 DIAGNOSIS — R18.8 ASCITES OF LIVER: ICD-10-CM

## 2022-11-01 DIAGNOSIS — Z85.038 HISTORY OF COLON CANCER: ICD-10-CM

## 2022-11-01 DIAGNOSIS — K74.69 CRYPTOGENIC CIRRHOSIS: ICD-10-CM

## 2022-11-01 DIAGNOSIS — K76.82 HEPATIC ENCEPHALOPATHY: ICD-10-CM

## 2022-11-01 LAB
A/G RATIO: 1.2
ALBUMIN: 3.5
ALKALINE PHOSPHATASE: 84
ALT (SGPT): 12
AST (SGOT): 26
BASO (ABSOLUTE): 0
BASOS: 1
BILIRUBIN, TOTAL: 1.9
BUN/CREATININE RATIO: 31
BUN: 33
CALCIUM: 9.1
CARBON DIOXIDE, TOTAL: 23
CHLORIDE: 98
CREATININE: 1.07
EGFR: 68
EOS (ABSOLUTE): 0
EOS: 1
FERRITIN, SERUM: 95
GLOBULIN, TOTAL: 2.9
GLUCOSE: 183
HEMATOCRIT: 22.3
HEMATOLOGY COMMENTS:: (no result)
HEMOGLOBIN: 7.2
IMMATURE CELLS: (no result)
IMMATURE GRANS (ABS): 0
IMMATURE GRANULOCYTES: 2
IRON BIND.CAP.(TIBC): 323
IRON SATURATION: 20
IRON: 63
LYMPHS (ABSOLUTE): 0.3
LYMPHS: 17
MCH: 35.3
MCHC: 32.3
MCV: 109
MONOCYTES(ABSOLUTE): 0.2
MONOCYTES: 11
NEUTROPHILS (ABSOLUTE): 1.4
NEUTROPHILS: 68
NRBC: 1
PLATELETS: 29
POTASSIUM: 4.3
PROTEIN, TOTAL: 6.4
RBC: 2.04
RDW: 18.5
SODIUM: 134
UIBC: 260
WBC: 2

## 2022-11-01 PROCEDURE — 99214 OFFICE O/P EST MOD 30 MIN: CPT | Performed by: INTERNAL MEDICINE

## 2022-11-01 RX ORDER — LACTULOSE 10 G/15ML
15 ML SOLUTION ORAL BID
Qty: 2700 ML | Refills: 1 | OUTPATIENT

## 2022-11-01 RX ORDER — CANAGLIFLOZIN 100 MG/1
TAKE 1 TABLET (100 MG) BY ORAL ROUTE ONCE DAILY BEFORE THE FIRST MEAL OF THE DAY TABLET, FILM COATED ORAL 1
Qty: 0 | Refills: 0 | Status: ON HOLD | COMMUNITY
Start: 1900-01-01

## 2022-11-01 RX ORDER — FUROSEMIDE 40 MG/1
1 TABLET TABLET ORAL TWICE A DAY
Qty: 60 TABLET | Refills: 1

## 2022-11-01 RX ORDER — LACTULOSE 10 G/15ML
15 ML SOLUTION ORAL BID
Qty: 2700 ML | Refills: 1 | Status: ACTIVE | COMMUNITY

## 2022-11-01 RX ORDER — VALSARTAN 160 MG/1
TABLET ORAL
Qty: 0 | Refills: 0 | Status: ON HOLD | COMMUNITY
Start: 1900-01-01

## 2022-11-01 RX ORDER — SPIRONOLACTONE 50 MG/1
1 TABLET TABLET, FILM COATED ORAL TWICE A DAY
Qty: 60 TABLET | Refills: 1

## 2022-11-01 RX ORDER — FUROSEMIDE 20 MG/1
TAKE ONE TABLET BY MOUTH DAILY TABLET ORAL
Qty: 90 TABLET | Refills: 0 | Status: ACTIVE | COMMUNITY

## 2022-11-01 RX ORDER — PANTOPRAZOLE 20 MG/1
TAKE ONE TABLET BY MOUTH DAILY TABLET, DELAYED RELEASE ORAL
Qty: 90 TABLET | Refills: 0 | Status: ACTIVE | COMMUNITY

## 2022-11-01 RX ORDER — NADOLOL 20 MG/1
TAKE ONE TABLET BY MOUTH DAILY TABLET ORAL
Qty: 75 | Status: ACTIVE | COMMUNITY

## 2022-11-01 RX ORDER — SPIRONOLACTONE 50 MG/1
1 TABLET TABLET, FILM COATED ORAL TWICE A DAY
Qty: 60 TABLET | Refills: 1 | Status: ACTIVE | COMMUNITY

## 2022-11-01 RX ORDER — METFORMIN HYDROCHLORIDE 500 MG/1
1 TABLET WITH A MEAL TABLET, FILM COATED ORAL ONCE A DAY
Status: ACTIVE | COMMUNITY

## 2022-11-01 RX ORDER — SITAGLIPTIN AND METFORMIN HYDROCHLORIDE 50; 1000 MG/1; MG/1
TAKE 1 TABLET BY ORAL ROUTE 2 TIMES PER DAY WITH MEALS TABLET, FILM COATED ORAL 2
Qty: 0 | Refills: 0 | Status: ACTIVE | COMMUNITY
Start: 1900-01-01

## 2022-11-01 RX ORDER — FUROSEMIDE 40 MG/1
1 TABLET TABLET ORAL TWICE A DAY
Qty: 60 TABLET | Refills: 1 | Status: ACTIVE | COMMUNITY

## 2022-11-01 NOTE — HPI-TODAY'S VISIT:
Here w daughter : reports feeling improved. denies overt GI bleeding. not as much ascites either.  doing more activities now and energy is increased.

## 2022-11-02 ENCOUNTER — TELEPHONE ENCOUNTER (OUTPATIENT)
Dept: URBAN - METROPOLITAN AREA CLINIC 6 | Facility: CLINIC | Age: 87
End: 2022-11-02

## 2022-11-02 LAB
A/G RATIO: 1.2
ABSOLUTE BASOPHILS: 11
ABSOLUTE EOSINOPHILS: 11
ABSOLUTE LYMPHOCYTES: 453
ABSOLUTE MONOCYTES: 233
ABSOLUTE NEUTROPHILS: 1492
AFP, SERUM, TUMOR MARKER: 2.9
ALBUMIN: 3.7
ALKALINE PHOSPHATASE: 71
ALT (SGPT): 11
AST (SGOT): 28
BASOPHILS: 0.5
BILIRUBIN, TOTAL: 2
BUN/CREATININE RATIO: 38
BUN: 36
CALCIUM: 9.8
CARBON DIOXIDE, TOTAL: 25
CHLORIDE: 103
COMMENT(S): (no result)
CREATININE: 0.95
EGFR: 78
EOSINOPHILS: 0.5
FERRITIN, SERUM: 59
GLOBULIN, TOTAL: 3.2
GLUCOSE: 133
HEMATOCRIT: 21.2
HEMOGLOBIN: 7.2
INR: 1
IRON BIND.CAP.(TIBC): 369
IRON SATURATION: 75
IRON: 277
LYMPHOCYTES: 20.6
MCH: 35.3
MCHC: 34
MCV: 103.9
MONOCYTES: 10.6
MPV: 14
NEUTROPHILS: 67.8
PLATELET COUNT: 28
POTASSIUM: 4.7
PROTEIN, TOTAL: 6.9
PT: 10.7
RDW: 17.9
RED BLOOD CELL COUNT: 2.04
SODIUM: 138
WHITE BLOOD CELL COUNT: 2.2

## 2022-11-06 ENCOUNTER — TELEPHONE ENCOUNTER (OUTPATIENT)
Dept: URBAN - METROPOLITAN AREA CLINIC 98 | Facility: CLINIC | Age: 87
End: 2022-11-06

## 2022-11-07 ENCOUNTER — ERX REFILL RESPONSE (OUTPATIENT)
Dept: URBAN - METROPOLITAN AREA CLINIC 78 | Facility: CLINIC | Age: 87
End: 2022-11-07

## 2022-11-07 RX ORDER — SPIRONOLACTONE 50 MG/1
1 TABLET TABLET, FILM COATED ORAL TWICE A DAY
Qty: 60 TABLET | Refills: 1 | OUTPATIENT

## 2022-11-07 RX ORDER — SPIRONOLACTONE 50 MG/1
TAKE ONE TABLET BY MOUTH DAILY TABLET, FILM COATED ORAL
Qty: 30 TABLET | Refills: 0 | OUTPATIENT

## 2022-11-07 RX ORDER — FUROSEMIDE 40 MG/1
TAKE ONE TABLET BY MOUTH DAILY TABLET ORAL
Qty: 30 TABLET | Refills: 0 | OUTPATIENT

## 2022-11-07 RX ORDER — FUROSEMIDE 40 MG/1
1 TABLET TABLET ORAL TWICE A DAY
Qty: 60 TABLET | Refills: 1 | OUTPATIENT

## 2022-12-06 ENCOUNTER — ERX REFILL RESPONSE (OUTPATIENT)
Dept: URBAN - METROPOLITAN AREA CLINIC 78 | Facility: CLINIC | Age: 87
End: 2022-12-06

## 2022-12-06 RX ORDER — SPIRONOLACTONE 50 MG/1
TAKE ONE TABLET BY MOUTH DAILY TABLET, FILM COATED ORAL
Qty: 30 TABLET | Refills: 0 | OUTPATIENT

## 2022-12-06 RX ORDER — FUROSEMIDE 40 MG/1
TAKE ONE TABLET BY MOUTH DAILY TABLET ORAL
Qty: 30 TABLET | Refills: 0 | OUTPATIENT

## 2023-01-03 ENCOUNTER — OFFICE VISIT (OUTPATIENT)
Dept: URBAN - METROPOLITAN AREA CLINIC 98 | Facility: CLINIC | Age: 88
End: 2023-01-03
Payer: MEDICARE

## 2023-01-03 VITALS
SYSTOLIC BLOOD PRESSURE: 101 MMHG | TEMPERATURE: 98 F | HEIGHT: 66 IN | WEIGHT: 124 LBS | BODY MASS INDEX: 19.93 KG/M2 | HEART RATE: 75 BPM | DIASTOLIC BLOOD PRESSURE: 40 MMHG

## 2023-01-03 DIAGNOSIS — Z85.038 HISTORY OF COLON CANCER: ICD-10-CM

## 2023-01-03 DIAGNOSIS — K74.69 CRYPTOGENIC CIRRHOSIS: ICD-10-CM

## 2023-01-03 DIAGNOSIS — K76.82 HEPATIC ENCEPHALOPATHY: ICD-10-CM

## 2023-01-03 DIAGNOSIS — R18.8 ASCITES OF LIVER: ICD-10-CM

## 2023-01-03 PROBLEM — 472969004: Status: ACTIVE | Noted: 2022-08-02

## 2023-01-03 PROBLEM — 389026000: Status: ACTIVE | Noted: 2020-06-16

## 2023-01-03 PROCEDURE — 99214 OFFICE O/P EST MOD 30 MIN: CPT | Performed by: INTERNAL MEDICINE

## 2023-01-03 RX ORDER — PANTOPRAZOLE 20 MG/1
TAKE ONE TABLET BY MOUTH DAILY TABLET, DELAYED RELEASE ORAL
Qty: 90 TABLET | Refills: 0 | Status: ACTIVE | COMMUNITY

## 2023-01-03 RX ORDER — METFORMIN HYDROCHLORIDE 500 MG/1
1 TABLET WITH A MEAL TABLET, FILM COATED ORAL ONCE A DAY
Status: ACTIVE | COMMUNITY

## 2023-01-03 RX ORDER — SPIRONOLACTONE 50 MG/1
TAKE ONE TABLET BY MOUTH DAILY TABLET, FILM COATED ORAL
Qty: 30 TABLET | Refills: 0 | Status: ACTIVE | COMMUNITY

## 2023-01-03 RX ORDER — FUROSEMIDE 40 MG/1
TAKE ONE TABLET BY MOUTH DAILY TABLET ORAL
Qty: 30 TABLET | Refills: 0 | Status: ACTIVE | COMMUNITY

## 2023-01-03 RX ORDER — NADOLOL 20 MG/1
TAKE ONE TABLET BY MOUTH DAILY TABLET ORAL
Qty: 75 | Status: ACTIVE | COMMUNITY

## 2023-01-03 RX ORDER — SITAGLIPTIN AND METFORMIN HYDROCHLORIDE 50; 1000 MG/1; MG/1
TAKE 1 TABLET BY ORAL ROUTE 2 TIMES PER DAY WITH MEALS TABLET, FILM COATED ORAL 2
Qty: 0 | Refills: 0 | Status: ACTIVE | COMMUNITY
Start: 1900-01-01

## 2023-01-03 RX ORDER — VALSARTAN 160 MG/1
TABLET ORAL
Qty: 0 | Refills: 0 | Status: ON HOLD | COMMUNITY
Start: 1900-01-01

## 2023-01-03 RX ORDER — CANAGLIFLOZIN 100 MG/1
TAKE 1 TABLET (100 MG) BY ORAL ROUTE ONCE DAILY BEFORE THE FIRST MEAL OF THE DAY TABLET, FILM COATED ORAL 1
Qty: 0 | Refills: 0 | Status: ON HOLD | COMMUNITY
Start: 1900-01-01

## 2023-01-03 RX ORDER — LACTULOSE 10 G/15ML
15 ML SOLUTION ORAL BID
Qty: 2700 ML | Refills: 1 | Status: ACTIVE | COMMUNITY

## 2023-01-03 RX ORDER — FUROSEMIDE 20 MG/1
TAKE ONE TABLET BY MOUTH DAILY TABLET ORAL
Qty: 90 TABLET | Refills: 0 | Status: ACTIVE | COMMUNITY

## 2023-01-03 NOTE — HPI-TODAY'S VISIT:
Here with his son.  overall they feel that he has been doing better over the last 6 months Says seeing Dr Arroyo tomorrow for a transfusion but has not needed a  transfusion x 3-4 months has questions regarding other supplements to reverse his liver disease, diet, etc.   they have reduced diuretic dose to 1/2 of previous dose  only a small amount of swelling in abdomen  no resp distress . thinking of going to [place]

## 2023-01-05 ENCOUNTER — ERX REFILL RESPONSE (OUTPATIENT)
Dept: URBAN - METROPOLITAN AREA CLINIC 78 | Facility: CLINIC | Age: 88
End: 2023-01-05

## 2023-01-05 RX ORDER — FUROSEMIDE 40 MG/1
TAKE ONE TABLET BY MOUTH DAILY TABLET ORAL
Qty: 30 TABLET | Refills: 0 | OUTPATIENT

## 2023-01-05 RX ORDER — SPIRONOLACTONE 50 MG/1
TAKE ONE TABLET BY MOUTH DAILY TABLET, FILM COATED ORAL
Qty: 30 TABLET | Refills: 0 | OUTPATIENT

## 2023-01-10 ENCOUNTER — ERX REFILL RESPONSE (OUTPATIENT)
Dept: URBAN - METROPOLITAN AREA CLINIC 78 | Facility: CLINIC | Age: 88
End: 2023-01-10

## 2023-01-10 RX ORDER — NADOLOL 20 MG/1
TAKE ONE TABLET BY MOUTH DAILY TABLET ORAL
Qty: 75 TABLET | Refills: 0 | OUTPATIENT

## 2023-01-10 RX ORDER — NADOLOL 20 MG/1
TAKE ONE TABLET BY MOUTH DAILY TABLET ORAL
Qty: 75 | OUTPATIENT

## 2023-02-06 ENCOUNTER — ERX REFILL RESPONSE (OUTPATIENT)
Dept: URBAN - METROPOLITAN AREA CLINIC 78 | Facility: CLINIC | Age: 88
End: 2023-02-06

## 2023-02-06 RX ORDER — FUROSEMIDE 40 MG/1
TAKE ONE TABLET BY MOUTH DAILY TABLET ORAL
Qty: 30 TABLET | Refills: 0 | OUTPATIENT

## 2023-02-14 ENCOUNTER — TELEPHONE ENCOUNTER (OUTPATIENT)
Dept: URBAN - METROPOLITAN AREA CLINIC 78 | Facility: CLINIC | Age: 88
End: 2023-02-14

## 2023-02-14 RX ORDER — SPIRONOLACTONE 50 MG/1
TAKE ONE TABLET BY MOUTH DAILY TABLET, FILM COATED ORAL
Qty: 30 TABLET | Refills: 4

## 2023-03-17 ENCOUNTER — LAB OUTSIDE AN ENCOUNTER (OUTPATIENT)
Dept: URBAN - METROPOLITAN AREA CLINIC 98 | Facility: CLINIC | Age: 88
End: 2023-03-17

## 2023-03-17 ENCOUNTER — OFFICE VISIT (OUTPATIENT)
Dept: URBAN - METROPOLITAN AREA CLINIC 98 | Facility: CLINIC | Age: 88
End: 2023-03-17
Payer: MEDICARE

## 2023-03-17 VITALS
HEIGHT: 65 IN | WEIGHT: 137 LBS | HEART RATE: 91 BPM | BODY MASS INDEX: 22.82 KG/M2 | TEMPERATURE: 97.4 F | DIASTOLIC BLOOD PRESSURE: 59 MMHG | SYSTOLIC BLOOD PRESSURE: 127 MMHG

## 2023-03-17 DIAGNOSIS — D63.8 ANEMIA IN CHRONIC ILLNESS: ICD-10-CM

## 2023-03-17 DIAGNOSIS — Z86.39 HISTORY OF DIABETES MELLITUS, TYPE II: ICD-10-CM

## 2023-03-17 DIAGNOSIS — Z85.038 HISTORY OF COLON CANCER: ICD-10-CM

## 2023-03-17 DIAGNOSIS — K74.69 CRYPTOGENIC CIRRHOSIS: ICD-10-CM

## 2023-03-17 DIAGNOSIS — R18.8 ASCITES OF LIVER: ICD-10-CM

## 2023-03-17 PROCEDURE — 99214 OFFICE O/P EST MOD 30 MIN: CPT | Performed by: INTERNAL MEDICINE

## 2023-03-17 RX ORDER — METFORMIN HYDROCHLORIDE 500 MG/1
1 TABLET WITH A MEAL TABLET, FILM COATED ORAL ONCE A DAY
Status: ACTIVE | COMMUNITY

## 2023-03-17 RX ORDER — SITAGLIPTIN AND METFORMIN HYDROCHLORIDE 50; 1000 MG/1; MG/1
TAKE 1 TABLET BY ORAL ROUTE 2 TIMES PER DAY WITH MEALS TABLET, FILM COATED ORAL 2
Qty: 0 | Refills: 0 | Status: ACTIVE | COMMUNITY
Start: 1900-01-01

## 2023-03-17 RX ORDER — LACTULOSE 10 G/15ML
15 ML SOLUTION ORAL BID
Qty: 2700 ML | Refills: 1 | Status: ACTIVE | COMMUNITY

## 2023-03-17 RX ORDER — FUROSEMIDE 20 MG/1
TAKE ONE TABLET BY MOUTH DAILY TABLET ORAL
Qty: 90 TABLET | Refills: 0 | Status: ACTIVE | COMMUNITY

## 2023-03-17 RX ORDER — CANAGLIFLOZIN 100 MG/1
TAKE 1 TABLET (100 MG) BY ORAL ROUTE ONCE DAILY BEFORE THE FIRST MEAL OF THE DAY TABLET, FILM COATED ORAL 1
Qty: 0 | Refills: 0 | Status: ON HOLD | COMMUNITY
Start: 1900-01-01

## 2023-03-17 RX ORDER — SPIRONOLACTONE 50 MG/1
TAKE ONE TABLET BY MOUTH DAILY TABLET, FILM COATED ORAL
Qty: 30 TABLET | Refills: 4 | Status: ACTIVE | COMMUNITY

## 2023-03-17 RX ORDER — VALSARTAN 160 MG/1
TABLET ORAL
Qty: 0 | Refills: 0 | Status: ON HOLD | COMMUNITY
Start: 1900-01-01

## 2023-03-17 RX ORDER — FUROSEMIDE 40 MG/1
TAKE ONE TABLET BY MOUTH DAILY TABLET ORAL
Qty: 30 TABLET | Refills: 0 | Status: ACTIVE | COMMUNITY

## 2023-03-17 RX ORDER — NADOLOL 20 MG/1
TAKE ONE TABLET BY MOUTH DAILY TABLET ORAL
Qty: 75 TABLET | Refills: 0 | Status: ACTIVE | COMMUNITY

## 2023-03-17 RX ORDER — PANTOPRAZOLE 20 MG/1
TAKE ONE TABLET BY MOUTH DAILY TABLET, DELAYED RELEASE ORAL
Qty: 90 TABLET | Refills: 0 | Status: ACTIVE | COMMUNITY

## 2023-03-17 NOTE — HPI-TODAY'S VISIT:
Here w daughter  they just saw HEmatology : having more nosebleeds  HB 6.6 - getting transfusion tomorrow  having more swelling in abdomen as well  overall feels a little stronger  planning to go back to [place] in a month

## 2023-03-18 PROBLEM — 429699009: Status: ACTIVE | Noted: 2022-08-02

## 2023-03-18 PROBLEM — 236004002: Status: ACTIVE | Noted: 2022-07-31

## 2023-03-18 PROBLEM — 234347009: Status: ACTIVE | Noted: 2023-03-18

## 2023-03-18 PROBLEM — 89580002: Status: ACTIVE | Noted: 2022-07-31

## 2023-03-18 LAB
A/G RATIO: 1.1
ALBUMIN: 3.2
ALKALINE PHOSPHATASE: 74
ALT (SGPT): 13
AST (SGOT): 27
BILIRUBIN, TOTAL: 2.5
BUN/CREATININE RATIO: 31
BUN: 33
CALCIUM: 8.8
CARBON DIOXIDE, TOTAL: 22
CHLORIDE: 107
CREATININE: 1.08
EGFR: 67
GLOBULIN, TOTAL: 2.8
GLUCOSE: 194
INR: 1.1
POTASSIUM: 4.2
PROTEIN, TOTAL: 6
PROTHROMBIN TIME: 11.3
SODIUM: 140

## 2023-03-20 ENCOUNTER — TELEPHONE ENCOUNTER (OUTPATIENT)
Dept: URBAN - METROPOLITAN AREA CLINIC 35 | Facility: CLINIC | Age: 88
End: 2023-03-20

## 2023-03-20 ENCOUNTER — TELEPHONE ENCOUNTER (OUTPATIENT)
Dept: URBAN - METROPOLITAN AREA CLINIC 96 | Facility: CLINIC | Age: 88
End: 2023-03-20

## 2023-03-23 ENCOUNTER — ERX REFILL RESPONSE (OUTPATIENT)
Dept: URBAN - METROPOLITAN AREA CLINIC 78 | Facility: CLINIC | Age: 88
End: 2023-03-23

## 2023-03-23 RX ORDER — NADOLOL 20 MG/1
TAKE ONE TABLET BY MOUTH DAILY TABLET ORAL
Qty: 75 TABLET | Refills: 0 | OUTPATIENT

## 2023-04-12 ENCOUNTER — ERX REFILL RESPONSE (OUTPATIENT)
Dept: URBAN - METROPOLITAN AREA CLINIC 98 | Facility: CLINIC | Age: 88
End: 2023-04-12

## 2023-04-12 RX ORDER — LACTULOSE 10 G/15ML
15 ML SOLUTION ORAL
Qty: 2700 MILLILITER | Refills: 1 | OUTPATIENT

## 2023-04-12 RX ORDER — LACTULOSE 10 G/15ML
15 ML SOLUTION ORAL BID
Qty: 2700 ML | Refills: 1 | OUTPATIENT

## 2023-09-17 ENCOUNTER — DASHBOARD ENCOUNTERS (OUTPATIENT)
Age: 88
End: 2023-09-17

## 2023-09-19 ENCOUNTER — OFFICE VISIT (OUTPATIENT)
Dept: URBAN - METROPOLITAN AREA CLINIC 98 | Facility: CLINIC | Age: 88
End: 2023-09-19